# Patient Record
Sex: FEMALE | Race: WHITE | NOT HISPANIC OR LATINO | Employment: UNEMPLOYED | URBAN - NONMETROPOLITAN AREA
[De-identification: names, ages, dates, MRNs, and addresses within clinical notes are randomized per-mention and may not be internally consistent; named-entity substitution may affect disease eponyms.]

---

## 2020-02-28 ENCOUNTER — THERAPY VISIT (OUTPATIENT)
Dept: CHIROPRACTIC MEDICINE | Facility: OTHER | Age: 25
End: 2020-02-28
Attending: CHIROPRACTOR
Payer: COMMERCIAL

## 2020-02-28 DIAGNOSIS — M99.02 SEGMENTAL AND SOMATIC DYSFUNCTION OF THORACIC REGION: ICD-10-CM

## 2020-02-28 DIAGNOSIS — G89.29 CHRONIC BILATERAL LOW BACK PAIN WITHOUT SCIATICA: ICD-10-CM

## 2020-02-28 DIAGNOSIS — M54.50 CHRONIC BILATERAL LOW BACK PAIN WITHOUT SCIATICA: ICD-10-CM

## 2020-02-28 DIAGNOSIS — M54.6 ACUTE RIGHT-SIDED THORACIC BACK PAIN: Primary | ICD-10-CM

## 2020-02-28 DIAGNOSIS — M54.2 CERVICALGIA: ICD-10-CM

## 2020-02-28 DIAGNOSIS — M99.05 SEGMENTAL AND SOMATIC DYSFUNCTION OF PELVIC REGION: ICD-10-CM

## 2020-02-28 DIAGNOSIS — M99.01 SEGMENTAL AND SOMATIC DYSFUNCTION OF CERVICAL REGION: ICD-10-CM

## 2020-02-28 DIAGNOSIS — M25.50 POLYARTHRALGIA: ICD-10-CM

## 2020-02-28 PROCEDURE — 99203 OFFICE O/P NEW LOW 30 MIN: CPT | Mod: 25 | Performed by: CHIROPRACTOR

## 2020-02-28 PROCEDURE — 98941 CHIROPRACT MANJ 3-4 REGIONS: CPT | Mod: AT | Performed by: CHIROPRACTOR

## 2020-02-28 SDOH — SOCIAL STABILITY: SOCIAL INSECURITY: WITHIN THE LAST YEAR, HAVE YOU BEEN AFRAID OF YOUR PARTNER OR EX-PARTNER?: NO
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
## 2020-03-09 ENCOUNTER — THERAPY VISIT (OUTPATIENT)
Dept: CHIROPRACTIC MEDICINE | Facility: OTHER | Age: 25
End: 2020-03-09
Attending: CHIROPRACTOR
Payer: COMMERCIAL

## 2020-03-09 DIAGNOSIS — M54.2 CERVICALGIA: ICD-10-CM

## 2020-03-09 DIAGNOSIS — M25.50 POLYARTHRALGIA: ICD-10-CM

## 2020-03-09 DIAGNOSIS — M99.05 SEGMENTAL AND SOMATIC DYSFUNCTION OF PELVIC REGION: ICD-10-CM

## 2020-03-09 DIAGNOSIS — M54.6 ACUTE RIGHT-SIDED THORACIC BACK PAIN: Primary | ICD-10-CM

## 2020-03-09 DIAGNOSIS — G89.29 CHRONIC BILATERAL LOW BACK PAIN WITHOUT SCIATICA: ICD-10-CM

## 2020-03-09 DIAGNOSIS — M54.50 CHRONIC BILATERAL LOW BACK PAIN WITHOUT SCIATICA: ICD-10-CM

## 2020-03-09 DIAGNOSIS — M99.01 SEGMENTAL AND SOMATIC DYSFUNCTION OF CERVICAL REGION: ICD-10-CM

## 2020-03-09 DIAGNOSIS — M99.02 SEGMENTAL AND SOMATIC DYSFUNCTION OF THORACIC REGION: ICD-10-CM

## 2020-03-09 PROCEDURE — 98941 CHIROPRACT MANJ 3-4 REGIONS: CPT | Mod: AT | Performed by: CHIROPRACTOR

## 2020-03-09 NOTE — PROGRESS NOTES
3/9/2020     Visit #:  2    Subjective:  Miladis Harper is a 24 year old female who is seen in f/u up for:        Acute right-sided thoracic back pain  Segmental and somatic dysfunction of thoracic region  Cervicalgia  Segmental and somatic dysfunction of cervical region  Chronic bilateral low back pain without sciatica  Segmental and somatic dysfunction of pelvic region  Polyarthralgia.     Since last visit on 2/28/2020,  Miladis Harper reports:    Area of chief complaint:  Lumbar :  Symptoms are graded at 5-7/10. The quality is described as dull, throbbing, aching, pressure and constant R low back pain into both hips.   Cervical :  Symptoms are graded at 5/10. The quality is described as tight/stiff, sore and constant.  Thoracic :  Symptoms are graded at 3-6/10. The quality is described as pinching right side and Frequent.    Motion has increased, but is still not normal. Patient feels that they felt less pressure, though sore for a few days after initial visit.  She started to tighten up about 4-5 days ago and looked forward to returning.     Objective:  The following was observed:    P: tenderness   A: static palpation demonstrates intersegmental asymmetry , cervical, thoracic, pelvis  R: motion palpation notes restricted motion, C1 , C3 , T4 , T8  and Sacrum   T: hypertonicity at: Lumbar erector spine, Sub-occipital and Traps Bilaterally    Segmental spinal dysfunction/restrictions found at:  C1 , C3 , T4 , T8 , Sacrum  and on right sacral base      Assessment:    Diagnoses:      1. Acute right-sided thoracic back pain    2. Segmental and somatic dysfunction of thoracic region    3. Cervicalgia    4. Segmental and somatic dysfunction of cervical region    5. Chronic bilateral low back pain without sciatica    6. Segmental and somatic dysfunction of pelvic region    7. Polyarthralgia        Patient's condition:  Patient had restrictions pre-manipulation and Symptoms are unchanged    Treatment  effectiveness:  Post manipulation there is better intersegmental movement, Patient claims to feel looser post manipulation and Patient states that they feel much better post manipulation but they gradually tighten up over time      Procedures:  CMT:  24691 Chiropractic manipulative treatment 3-4 regions performed   Cervical: Diversified, C1 , C3 , Supine  Thoracic: Diversified, T4, T8, Prone  Pelvis: Diversified, Sacrum , PSIS Right , Side posture    Modalities:  None performed this visit    Therapeutic procedures:  None  The following were demonstrated and practiced: hip flexor and quadriceps lunging and standing  Stretches - Reviewed stretches today.  Total time: 5 min.    Response to Treatment  Reduction in symptoms as reported by patient    Prognosis: Good    Progress towards Goals: Patient is making progress towards the goal.     Recommendations:    Instructions:  ice 20 minutes every other hour as needed, stretch as instructed at visit and  hip flexor and quadriceps lunging and standing    Follow-up:    Return to care in 3-4 days recommended. Next appt. Scheduled is 3/26/19 due to provider out of office.  Should you need care sooner, schedule with Viktor Yeung D.C.

## 2020-03-14 NOTE — PATIENT INSTRUCTIONS
ice 20 minutes every other hour as needed, stretch as instructed at visit and  hip flexor and quadriceps lunging and standing    Follow-up:    Return to care in 3-4 days recommended. Next appt. Scheduled is 3/26/19 due to provider out of office.  Should you need care sooner, schedule with Viktor Yeung D.C.     Schedule appt. To establish medical care with a primary care provider.

## 2021-07-17 ENCOUNTER — HOSPITAL ENCOUNTER (EMERGENCY)
Facility: OTHER | Age: 26
Discharge: HOME | End: 2021-07-17
Attending: FAMILY MEDICINE | Admitting: FAMILY MEDICINE
Payer: COMMERCIAL

## 2021-07-17 VITALS
SYSTOLIC BLOOD PRESSURE: 145 MMHG | HEIGHT: 66 IN | BODY MASS INDEX: 28.93 KG/M2 | DIASTOLIC BLOOD PRESSURE: 97 MMHG | RESPIRATION RATE: 18 BRPM | HEART RATE: 78 BPM | WEIGHT: 180 LBS | OXYGEN SATURATION: 100 % | TEMPERATURE: 98.9 F

## 2021-07-17 DIAGNOSIS — R79.89 ELEVATED LFTS: ICD-10-CM

## 2021-07-17 DIAGNOSIS — R03.0 ELEVATED BLOOD PRESSURE READING: ICD-10-CM

## 2021-07-17 LAB
ALBUMIN SERPL-MCNC: 3.2 G/DL (ref 3.5–5.7)
ALBUMIN UR-MCNC: NEGATIVE MG/DL
ALP SERPL-CCNC: 98 U/L (ref 34–104)
ALT SERPL W P-5'-P-CCNC: 57 U/L (ref 7–52)
ANION GAP SERPL CALCULATED.3IONS-SCNC: 8 MMOL/L (ref 3–14)
APPEARANCE UR: CLEAR
AST SERPL W P-5'-P-CCNC: 42 U/L (ref 13–39)
BACTERIA #/AREA URNS HPF: ABNORMAL /HPF
BASOPHILS # BLD AUTO: 0.1 10E3/UL (ref 0–0.2)
BASOPHILS NFR BLD AUTO: 1 %
BILIRUB SERPL-MCNC: 0.3 MG/DL (ref 0.3–1)
BILIRUB UR QL STRIP: NEGATIVE
BUN SERPL-MCNC: 12 MG/DL (ref 7–25)
CALCIUM SERPL-MCNC: 8.5 MG/DL (ref 8.6–10.3)
CHLORIDE BLD-SCNC: 106 MMOL/L (ref 98–107)
CO2 SERPL-SCNC: 23 MMOL/L (ref 21–31)
COLOR UR AUTO: ABNORMAL
CREAT SERPL-MCNC: 0.85 MG/DL (ref 0.6–1.2)
EOSINOPHIL # BLD AUTO: 0.2 10E3/UL (ref 0–0.7)
EOSINOPHIL NFR BLD AUTO: 3 %
ERYTHROCYTE [DISTWIDTH] IN BLOOD BY AUTOMATED COUNT: 15.9 % (ref 10–15)
GFR SERPL CREATININE-BSD FRML MDRD: >90 ML/MIN/1.73M2
GLUCOSE BLD-MCNC: 113 MG/DL (ref 70–105)
GLUCOSE UR STRIP-MCNC: NEGATIVE MG/DL
HCT VFR BLD AUTO: 29.6 % (ref 35–47)
HGB BLD-MCNC: 9.3 G/DL (ref 11.7–15.7)
HGB UR QL STRIP: ABNORMAL
IMM GRANULOCYTES # BLD: 0.1 10E3/UL
IMM GRANULOCYTES NFR BLD: 1 %
KETONES UR STRIP-MCNC: NEGATIVE MG/DL
LEUKOCYTE ESTERASE UR QL STRIP: ABNORMAL
LYMPHOCYTES # BLD AUTO: 2.3 10E3/UL (ref 0.8–5.3)
LYMPHOCYTES NFR BLD AUTO: 29 %
MCH RBC QN AUTO: 25.2 PG (ref 26.5–33)
MCHC RBC AUTO-ENTMCNC: 31.4 G/DL (ref 31.5–36.5)
MCV RBC AUTO: 80 FL (ref 78–100)
MONOCYTES # BLD AUTO: 0.6 10E3/UL (ref 0–1.3)
MONOCYTES NFR BLD AUTO: 8 %
NEUTROPHILS # BLD AUTO: 4.7 10E3/UL (ref 1.6–8.3)
NEUTROPHILS NFR BLD AUTO: 58 %
NITRATE UR QL: NEGATIVE
NRBC # BLD AUTO: 0 10E3/UL
NRBC BLD AUTO-RTO: 0 /100
PH UR STRIP: 7 [PH] (ref 5–9)
PLATELET # BLD AUTO: 180 10E3/UL (ref 150–450)
POTASSIUM BLD-SCNC: 3.5 MMOL/L (ref 3.5–5.1)
PROT SERPL-MCNC: 5.8 G/DL (ref 6.4–8.9)
RBC # BLD AUTO: 3.69 10E6/UL (ref 3.8–5.2)
RBC URINE: 60 /HPF
SODIUM SERPL-SCNC: 137 MMOL/L (ref 134–144)
SP GR UR STRIP: 1.01 (ref 1–1.03)
UROBILINOGEN UR STRIP-MCNC: NORMAL MG/DL
WBC # BLD AUTO: 7.9 10E3/UL (ref 4–11)
WBC URINE: 23 /HPF

## 2021-07-17 PROCEDURE — 85025 COMPLETE CBC W/AUTO DIFF WBC: CPT | Performed by: FAMILY MEDICINE

## 2021-07-17 PROCEDURE — 99283 EMERGENCY DEPT VISIT LOW MDM: CPT | Performed by: FAMILY MEDICINE

## 2021-07-17 PROCEDURE — 87086 URINE CULTURE/COLONY COUNT: CPT | Performed by: FAMILY MEDICINE

## 2021-07-17 PROCEDURE — 36415 COLL VENOUS BLD VENIPUNCTURE: CPT | Performed by: FAMILY MEDICINE

## 2021-07-17 PROCEDURE — 80053 COMPREHEN METABOLIC PANEL: CPT | Performed by: FAMILY MEDICINE

## 2021-07-17 PROCEDURE — 81001 URINALYSIS AUTO W/SCOPE: CPT | Performed by: FAMILY MEDICINE

## 2021-07-17 RX ORDER — ONDANSETRON 4 MG/1
TABLET, ORALLY DISINTEGRATING ORAL
COMMUNITY
Start: 2021-02-16 | End: 2022-08-08

## 2021-07-17 RX ORDER — INSULIN DETEMIR 100 [IU]/ML
INJECTION, SOLUTION SUBCUTANEOUS
COMMUNITY
Start: 2021-06-11 | End: 2022-08-08

## 2021-07-17 ASSESSMENT — ENCOUNTER SYMPTOMS
CHILLS: 0
CHEST TIGHTNESS: 0
HEADACHES: 0
DYSURIA: 0
LIGHT-HEADEDNESS: 0
FEVER: 0
NUMBNESS: 0

## 2021-07-17 ASSESSMENT — MIFFLIN-ST. JEOR: SCORE: 1573.22

## 2021-07-17 NOTE — ED TRIAGE NOTES
"ED Nursing Triage Note (General)   ________________________________    Miladis Harper is a 26 year old Female that presents to triage private car  With history of  Having a BP of 144/100 this am. Pt gave birth on Monday which ended in a fatality of babe. Pt started to have BP issues on July 9th. Pt was told to keep track of her BP. Pt denies having any symptoms at this time with her high BP.   BP (!) 159/94   Pulse 91   Temp 98.9  F (37.2  C) (Tympanic)   Resp 18   Ht 1.676 m (5' 6\")   Wt 81.6 kg (180 lb)   SpO2 100%   Breastfeeding No   BMI 29.05 kg/m  t  Patient appears alert  and oriented, in no acute distress., and cooperative and pleasant behavior.  GCS Total = 15  Airway: intact  Breathing noted as Normal  Circulation Normal  Skin:  Normal  Action taken:  Triage to critical care immediately      PRE HOSPITAL PRIOR LIVING SITUATION Spouse  "

## 2021-07-17 NOTE — ED PROVIDER NOTES
History     Chief Complaint   Patient presents with     Hypertension     HPI  Miladis Harper is a 26 year old female who presents emergency department several days after discharge from [facility] after unfortunate entry of uterine fetal death, she was diagnosed with postpartum preeclampsia and no medications were prescribed but recommended she keep a close eye on her blood pressures.  Patient feels fine today.  Blood pressures have been elevated however.  Reviewed nurses notes below, similar history is related to me.  Miladis Harper is a 26 year old Female that presents to triage private car  With history of  Having a BP of 144/100 this am. Pt gave birth on Monday which ended in a fatality of babe. Pt started to have BP issues on July 9th. Pt was told to keep track of her BP. Pt denies having any symptoms at this time with her high BP.   Allergies:  No Known Allergies    Problem List:    Patient Active Problem List    Diagnosis Date Noted     Polyarthralgia 02/28/2020     Priority: Medium        Past Medical History:    No past medical history on file.    Past Surgical History:    No past surgical history on file.    Family History:    Family History   Problem Relation Age of Onset     Diabetes Type 2  Father      Ataxia Sister      Cancer Maternal Grandmother        Social History:  Marital Status:   [2]  Social History     Tobacco Use     Smoking status: Not on file   Substance Use Topics     Alcohol use: Not on file     Drug use: Not on file        Medications:    sertraline (ZOLOFT) 50 MG tablet  LEVEMIR FLEXTOUCH 100 UNIT/ML pen  ondansetron (ZOFRAN-ODT) 4 MG ODT tab          Review of Systems   Constitutional: Negative for chills and fever.   HENT: Negative for congestion.    Respiratory: Negative for chest tightness.    Genitourinary: Negative for dysuria.   Neurological: Negative for light-headedness, numbness and headaches.       Physical Exam   BP: (!) 159/94  Pulse: 91  Temp: 98.9  F  "(37.2  C)  Resp: 18  Height: 167.6 cm (5' 6\")  Weight: 81.6 kg (180 lb)  SpO2: 100 %      Physical Exam  Vitals and nursing note reviewed.   Cardiovascular:      Rate and Rhythm: Normal rate.   Abdominal:      Tenderness: There is no abdominal tenderness.   Musculoskeletal:      Cervical back: Normal range of motion.   Neurological:      Mental Status: She is alert.       No clonus, normal deep tendon reflexes.  ED Course        Procedures    Results for orders placed or performed during the hospital encounter of 07/17/21 (from the past 24 hour(s))   Comprehensive metabolic panel   Result Value Ref Range    Sodium 137 134 - 144 mmol/L    Potassium 3.5 3.5 - 5.1 mmol/L    Chloride 106 98 - 107 mmol/L    Carbon Dioxide (CO2) 23 21 - 31 mmol/L    Anion Gap 8 3 - 14 mmol/L    Urea Nitrogen 12 7 - 25 mg/dL    Creatinine 0.85 0.60 - 1.20 mg/dL    Calcium 8.5 (L) 8.6 - 10.3 mg/dL    Glucose 113 (H) 70 - 105 mg/dL    Alkaline Phosphatase 98 34 - 104 U/L    AST 42 (H) 13 - 39 U/L    ALT 57 (H) 7 - 52 U/L    Protein Total 5.8 (L) 6.4 - 8.9 g/dL    Albumin 3.2 (L) 3.5 - 5.7 g/dL    Bilirubin Total 0.3 0.3 - 1.0 mg/dL    GFR Estimate >90 >60 mL/min/1.73m2   CBC with platelets differential    Narrative    The following orders were created for panel order CBC with platelets differential.  Procedure                               Abnormality         Status                     ---------                               -----------         ------                     CBC with platelets and d...[[id number]]  Abnormal            Final result                 Please view results for these tests on the individual orders.   CBC with platelets and differential   Result Value Ref Range    WBC Count 7.9 4.0 - 11.0 10e3/uL    RBC Count 3.69 (L) 3.80 - 5.20 10e6/uL    Hemoglobin 9.3 (L) 11.7 - 15.7 g/dL    Hematocrit 29.6 (L) 35.0 - 47.0 %    MCV 80 78 - 100 fL    MCH 25.2 (L) 26.5 - 33.0 pg    MCHC 31.4 (L) 31.5 - 36.5 g/dL    RDW 15.9 (H) 10.0 - " 15.0 %    Platelet Count 180 150 - 450 10e3/uL    % Neutrophils 58 %    % Lymphocytes 29 %    % Monocytes 8 %    % Eosinophils 3 %    % Basophils 1 %    % Immature Granulocytes 1 %    NRBCs per 100 WBC 0 <1 /100    Absolute Neutrophils 4.7 1.6 - 8.3 10e3/uL    Absolute Lymphocytes 2.3 0.8 - 5.3 10e3/uL    Absolute Monocytes 0.6 0.0 - 1.3 10e3/uL    Absolute Eosinophils 0.2 0.0 - 0.7 10e3/uL    Absolute Basophils 0.1 0.0 - 0.2 10e3/uL    Absolute Immature Granulocytes 0.1 (H) <=0.0 10e3/uL    Absolute NRBCs 0.0 10e3/uL   UA with Microscopic reflex to Culture    Specimen: Urine, Midstream   Result Value Ref Range    Color Urine Light Yellow Colorless, Straw, Light Yellow, Yellow    Appearance Urine Clear Clear    Glucose Urine Negative Negative mg/dL    Bilirubin Urine Negative Negative    Ketones Urine Negative Negative mg/dL    Specific Gravity Urine 1.010 1.000 - 1.030    Blood Urine Large (A) Negative    pH Urine 7.0 5.0 - 9.0    Protein Albumin Urine Negative Negative mg/dL    Urobilinogen Urine Normal Normal, 2.0 mg/dL    Nitrite Urine Negative Negative    Leukocyte Esterase Urine Large (A) Negative    Bacteria Urine Few (A) None Seen /HPF    RBC Urine 60 (H) <=2 /HPF    WBC Urine 23 (H) <=5 /HPF    Narrative    Urine Culture ordered based on laboratory criteria       Medications - No data to display    Assessments & Plan (with Medical Decision Making)     I have reviewed the nursing notes.    I have reviewed the findings, diagnosis, plan and need for follow up with the patient.  Reassuring lab work-up, LFTs trending down, discussed with OB, no antihypertensives recommended at this time.  Close follow-up recommended.  Patient has follow-up this week with essential providers.  Recommend she follow-up with either Dr. Wesley or Minerva as previously recommended.  Return to ED with worsening symptoms such as persistent blood pressure over 160 headache visual changes or persistent abdominal pain.  Patient verbalized  understanding plans agreement left ED in improving condition.    New Prescriptions    No medications on file       Final diagnoses:   Elevated blood pressure reading   Elevated LFTs       7/17/2021   [facility]     Joey Bran MD  07/17/21 [id number]

## 2021-07-19 LAB — BACTERIA UR CULT: NORMAL

## 2021-07-20 ENCOUNTER — THERAPY VISIT (OUTPATIENT)
Dept: CHIROPRACTIC MEDICINE | Facility: OTHER | Age: 26
End: 2021-07-20
Attending: CHIROPRACTOR
Payer: COMMERCIAL

## 2021-07-20 DIAGNOSIS — M99.02 SEGMENTAL AND SOMATIC DYSFUNCTION OF THORACIC REGION: Primary | ICD-10-CM

## 2021-07-20 DIAGNOSIS — M54.50 CHRONIC BILATERAL LOW BACK PAIN WITHOUT SCIATICA: ICD-10-CM

## 2021-07-20 DIAGNOSIS — M99.04 SEGMENTAL AND SOMATIC DYSFUNCTION OF SACRAL REGION: ICD-10-CM

## 2021-07-20 DIAGNOSIS — M99.01 SEGMENTAL AND SOMATIC DYSFUNCTION OF CERVICAL REGION: ICD-10-CM

## 2021-07-20 DIAGNOSIS — M54.6 PAIN IN THORACIC SPINE: ICD-10-CM

## 2021-07-20 DIAGNOSIS — G89.29 CHRONIC BILATERAL LOW BACK PAIN WITHOUT SCIATICA: ICD-10-CM

## 2021-07-20 DIAGNOSIS — M54.2 CERVICALGIA: ICD-10-CM

## 2021-07-20 DIAGNOSIS — M99.03 SEGMENTAL AND SOMATIC DYSFUNCTION OF LUMBAR REGION: ICD-10-CM

## 2021-07-20 PROCEDURE — 99213 OFFICE O/P EST LOW 20 MIN: CPT | Mod: 25 | Performed by: CHIROPRACTOR

## 2021-07-20 PROCEDURE — 98941 CHIROPRACT MANJ 3-4 REGIONS: CPT | Mod: AT | Performed by: CHIROPRACTOR

## 2021-07-20 NOTE — RESULT ENCOUNTER NOTE
Final urine culture report is negative.  Adult Negative Urine culture parameters per protocol: Any # Urogenital single or mixed organism, <10,000 col/ml single organism (cath specimen), and <50,000 col/ml single organism (midstream or cath specimen).  [facility] Emergency Dept discharge antibiotic prescribed (If applicable): None  Treatment recommendations per [facility] ED Lab Result Urine Culture protocol.
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
## 2021-07-27 ENCOUNTER — THERAPY VISIT (OUTPATIENT)
Dept: CHIROPRACTIC MEDICINE | Facility: OTHER | Age: 26
End: 2021-07-27
Attending: CHIROPRACTOR
Payer: COMMERCIAL

## 2021-07-27 DIAGNOSIS — M54.2 CERVICALGIA: ICD-10-CM

## 2021-07-27 DIAGNOSIS — M54.50 CHRONIC BILATERAL LOW BACK PAIN WITHOUT SCIATICA: ICD-10-CM

## 2021-07-27 DIAGNOSIS — M54.6 PAIN IN THORACIC SPINE: ICD-10-CM

## 2021-07-27 DIAGNOSIS — M99.04 SEGMENTAL AND SOMATIC DYSFUNCTION OF SACRAL REGION: ICD-10-CM

## 2021-07-27 DIAGNOSIS — G89.29 CHRONIC BILATERAL LOW BACK PAIN WITHOUT SCIATICA: ICD-10-CM

## 2021-07-27 DIAGNOSIS — M99.01 SEGMENTAL AND SOMATIC DYSFUNCTION OF CERVICAL REGION: ICD-10-CM

## 2021-07-27 DIAGNOSIS — M99.02 SEGMENTAL AND SOMATIC DYSFUNCTION OF THORACIC REGION: Primary | ICD-10-CM

## 2021-07-27 PROCEDURE — 98941 CHIROPRACT MANJ 3-4 REGIONS: CPT | Mod: AT | Performed by: CHIROPRACTOR

## 2021-07-27 NOTE — PROGRESS NOTES
Visit #:  2    Subjective:  Miladis Harper is a 26 year old female who is seen in f/u up for:        Segmental and somatic dysfunction of thoracic region  Pain in thoracic spine  Segmental and somatic dysfunction of cervical region  Cervicalgia  Segmental and somatic dysfunction of sacral region  Chronic bilateral low back pain without sciatica.     Since last visit on 7/20/2021,  Miladis Harper reports: Following last treatment symptoms showed quite a bit of improvement for a few days.  Notes that over the past couple of days pain has been returning to current levels.  Reports that she has been able to receive massages from her spouse which seem to be more effective in helping to reduce neck and back pain primarily of the neck and upper back region.    Notes majority of back pain present when she performs bending to extension motions such as picking up objects off the ground.    Reports that blood pressure has been decreasing as well.  Currently scheduled to see Sayra Soriano NP with [facility] soon before following up with Dr. Lupillo LOPEZ.    Area of chief complaint:  Cervical :  Symptoms are graded at 3/10. The quality is described as occasionally stiff.    Thoracic :  Symptoms are graded at 3/10. The quality is described as frequent pinching.   Lumbar :  Symptoms are graded at 5-8/10. The quality is described as achey, constantly.      Objective:  The following was observed:    P: palpatory tendernessRight side C1, left side C6, T3 on right, none elicited of the lumbopelvic spine:    A: static palpation demonstrates intersegmental asymmetry , cervical, thoracic, pelvis  R: motion palpation notes restricted motion, C1 , C6 , T3  and Sacrum   T: Mild spasms noted of the cervical paraspinal musculature bilaterally, right rhomboids.  Hypertonicity noted of the lumbar paraspinals bilaterally left greater than right    Segmental spinal dysfunction/restrictions found at:  :  C1 Left rotation restricted and Right lateral  flexion restricted  C6 Right rotation restricted, Left lateral flexion restricted and Extension restriction  T3 Right lateral flexion restricted and Extension restriction  Sacrum Left lateral flexion restricted and Extension restriction.      Assessment: Symptoms are showing signs of progress.  Continue once a week care.    Diagnoses:      1. Segmental and somatic dysfunction of thoracic region    2. Pain in thoracic spine    3. Segmental and somatic dysfunction of cervical region    4. Cervicalgia    5. Segmental and somatic dysfunction of sacral region    6. Chronic bilateral low back pain without sciatica        Patient's condition:  Patient had restrictions pre-manipulation, Patient symptoms are gradually improving and Symptoms come and go    Treatment effectiveness:  Post manipulation there is better intersegmental movement, Patient states that they feel much better post manipulation but they gradually tighten up over time, Patients symptoms are getting better, Tenderness is decreasing and Muscle spasm is reducing      Procedures:  CMT:  25589 Chiropractic manipulative treatment 3-4 regions performed   Cervical: Diversified, C1 , C6, Supine  Thoracic: Diversified, T3, Prone  Pelvis: Diversified, Sacrum , Side posture    Modalities:  None performed this visit    Therapeutic procedures:  None    Response to Treatment  Reduction in symptoms as reported by patient    Prognosis: Good    Progress towards Goals: Patient is making progress towards the goal.     Recommendations:    Instructions:Monitor symptoms and perform activities as tolerated    Follow-up:  Return to care in 1 week.

## 2021-08-03 ENCOUNTER — THERAPY VISIT (OUTPATIENT)
Dept: CHIROPRACTIC MEDICINE | Facility: OTHER | Age: 26
End: 2021-08-03
Attending: CHIROPRACTOR
Payer: COMMERCIAL

## 2021-08-03 DIAGNOSIS — M99.01 SEGMENTAL AND SOMATIC DYSFUNCTION OF CERVICAL REGION: ICD-10-CM

## 2021-08-03 DIAGNOSIS — M99.02 SEGMENTAL AND SOMATIC DYSFUNCTION OF THORACIC REGION: Primary | ICD-10-CM

## 2021-08-03 DIAGNOSIS — G89.29 CHRONIC BILATERAL LOW BACK PAIN WITHOUT SCIATICA: ICD-10-CM

## 2021-08-03 DIAGNOSIS — M54.2 CERVICALGIA: ICD-10-CM

## 2021-08-03 DIAGNOSIS — M54.6 PAIN IN THORACIC SPINE: ICD-10-CM

## 2021-08-03 DIAGNOSIS — M54.50 CHRONIC BILATERAL LOW BACK PAIN WITHOUT SCIATICA: ICD-10-CM

## 2021-08-03 DIAGNOSIS — M99.03 SEGMENTAL AND SOMATIC DYSFUNCTION OF LUMBAR REGION: ICD-10-CM

## 2021-08-03 PROCEDURE — 98941 CHIROPRACT MANJ 3-4 REGIONS: CPT | Mod: AT | Performed by: CHIROPRACTOR

## 2021-08-03 NOTE — PROGRESS NOTES
Visit #:  3    Subjective:  Miladis Harper is a 26 year old female who is seen in f/u up for:        Segmental and somatic dysfunction of thoracic region  Pain in thoracic spine  Segmental and somatic dysfunction of cervical region  Cervicalgia  Segmental and somatic dysfunction of lumbar region  Chronic bilateral low back pain without sciatica.     Since last visit on 7/27/2021,  Miladis Harper reports: Symptoms showed modest improvement after last visit.  Noted that on Sunday she awoke from a nap with a left-sided low back spasm.  This has remained quite sore since then.  It does seem that upper back symptoms also slightly more aggravated on today's visit.  Neck pain does appear to be improving however.    Blood pressure continues to show improvement.  Was able to follow-up with Sayra Soirano NP with [facility]. Post OB visit with Dr. Lupillo LOPEZ scheduled for 8/30/21    Reports noticing increasing levels of bilateral knee pain.  Patient unsure if there is any clicking, catching, or other such symptoms present at this time.  Most notable when she is walking up and down stairs.    Patient's  is in attendance on today's visit.    Area of chief complaint:  Thoracic :  Symptoms are graded at 4/10. The quality is described as frequently achey.     Cervical :  Symptoms are graded at 2/10. The quality is described as occasionally achey.  Lumbar :  Symptoms are graded at 5-6/10. The quality is described as achey, and spasmed constantly.    Objective:  The following was observed:    P: palpatory tendernessSuboccipital ridge bilaterally, right side T3, left side L5, left quadratus lumborum, palpatory tenderness mild of the lateral quadriceps and TF L's bilaterally:    A: static palpation demonstrates intersegmental asymmetry , cervical, thoracic, lumbar  R: motion palpation notes restricted motion, C1 , C2  and T3  L5  T: muscle spasm at level(s): Right rhomboids, suboccipitals bilaterally, left quadratus  lumborum, taut and tender fibers noted TF L bilaterally, lateral quadricep bilaterally:  Bilaterally    Segmental spinal dysfunction/restrictions found at:  C1 Left rotation restricted and Right lateral flexion restricted  C2 Left lateral flexion restricted and Extension restriction  T3 Left rotation restricted and Extension restriction  L5 Right rotation restricted, Left lateral flexion restricted and Extension restriction.      Assessment: Patient is showing signs of progress.  Mild/moderate aggravation low back pain.  Evidence of spasm of the cubital muscle.  Provided patient reassurance that it is not abnormal for symptoms to flare especially at this point in treatment.  This should resolve with time and treatment.    Bilateral knee pain likely secondary to tight quadricep musculature and TF L musculature.  Provide patient with home resources, stretching and self myofascial release to help lessen stress placed on the patella.      Continue at once a week care barring acute exacerbation of symptoms.        Diagnoses:      1. Segmental and somatic dysfunction of thoracic region    2. Pain in thoracic spine    3. Segmental and somatic dysfunction of cervical region    4. Cervicalgia    5. Segmental and somatic dysfunction of lumbar region    6. Chronic bilateral low back pain without sciatica        Patient's condition:  Patient had restrictions pre-manipulation, Symptoms come and go and Patient symptoms are worsened.    Treatment effectiveness:  Post manipulation there is better intersegmental movement and Patient claims to feel looser post manipulation      Procedures:  CMT:  22041 Chiropractic manipulative treatment 3-4 regions performed   Cervical: Diversified, C1 , C2, Supine  Thoracic: Diversified, T3, Prone  Lumbar: Diversified, L5, Side posture    Modalities:  52307: IASTM: To Right rhomboids:  for 1 min    Therapeutic procedures:  Encourage myofascial release/foam rolling quadricep musculature bilaterally,  T FL muscle bilaterally  Quadricep stretch reviewed     Response to Treatment  Reduction in symptoms as reported by patient    Prognosis: Good    Progress towards Goals: Patient is making progress towards the goal.    Patient will report improved pain.              Patient will report able to recreate without painful limits.              Patient will demonstrate proper use of home care strategies              Patient will demonstrate an improved ability to complete Activities of Daily Living   as shown by a reported 10-30% reduced score on neck and/or back index.               Patient will demonstrate improved ROM.    Recommendations:    Instructions:stretch as instructed at visit    Follow-up:  Return to care in 1 week.

## 2021-08-09 ENCOUNTER — THERAPY VISIT (OUTPATIENT)
Dept: CHIROPRACTIC MEDICINE | Facility: OTHER | Age: 26
End: 2021-08-09
Attending: CHIROPRACTOR
Payer: COMMERCIAL

## 2021-08-09 DIAGNOSIS — M54.6 PAIN IN THORACIC SPINE: ICD-10-CM

## 2021-08-09 DIAGNOSIS — M99.02 SEGMENTAL AND SOMATIC DYSFUNCTION OF THORACIC REGION: Primary | ICD-10-CM

## 2021-08-09 DIAGNOSIS — M99.03 SEGMENTAL AND SOMATIC DYSFUNCTION OF LUMBAR REGION: ICD-10-CM

## 2021-08-09 DIAGNOSIS — M54.50 CHRONIC BILATERAL LOW BACK PAIN WITHOUT SCIATICA: ICD-10-CM

## 2021-08-09 DIAGNOSIS — G89.29 CHRONIC BILATERAL LOW BACK PAIN WITHOUT SCIATICA: ICD-10-CM

## 2021-08-09 PROCEDURE — 98940 CHIROPRACT MANJ 1-2 REGIONS: CPT | Mod: AT | Performed by: CHIROPRACTOR

## 2021-08-09 NOTE — PROGRESS NOTES
Visit #:  4    Subjective:  Miladis Harper is a 26 year old female who is seen in f/u up for:        Segmental and somatic dysfunction of thoracic region  Pain in thoracic spine  Segmental and somatic dysfunction of lumbar region  Chronic bilateral low back pain without sciatica.     Since last visit on 8/3/2021,  Miladis Harper reports: Neck symptoms showing some improvement since last visit.  Patient states that over the past week she was much more active and believes this has increased some of her neck and back pain symptoms.  Reports doing home stretching exercises recommended from last visit.  These do seem to be providing some relief.    Bilateral knee pain still present.  Unsure if home stretches from prior visit providing much relief with these.    Noted blood pressure in her feet especially when sitting on the ground with her feet contacting hard surface.  No lower extremity radiculopathy noted at this time, no swelling noted of the lower extremities.    Area of chief complaint:  Thoracic :  Symptoms are graded at 4/10. The quality is described as frequently pinching.   Lumbar :  Symptoms are graded at 6/10. The quality is described as constantly achey and sore.   Cervical :  Symptoms are graded at 0/10.      Objective:  The following was observed:    P: Cervical paraspinal musculature bilaterally, T3 right, L5 on left  A: static palpation demonstrates intersegmental asymmetry , thoracic, lumbar  R: motion palpation notes restricted motion, T3  and L5   T: muscle spasm at level(s): Cervical paraspinal musculature right greater than left, taut tender fibers noted right rhomboids, taut tender fibers noted left quadratus lumborum:      Segmental spinal dysfunction/restrictions found at:  :  T3 Right lateral flexion restricted and Extension restriction  L5 Left lateral flexion restricted and Extension restriction.      Assessment: Patient does appear to be showing slow signs of progress.  Continue care at  once week.    Diagnoses:      1. Segmental and somatic dysfunction of thoracic region    2. Pain in thoracic spine    3. Segmental and somatic dysfunction of lumbar region    4. Chronic bilateral low back pain without sciatica        Patient's condition:  Patient had restrictions pre-manipulation    Treatment effectiveness:  Post manipulation there is better intersegmental movement and Patient claims to feel looser post manipulation      Procedures:  CMT:  74659 Chiropractic manipulative treatment 1-2 regions performed   Thoracic: Diversified, T3, Prone  Lumbar: Diversified, L5, Side posture    Modalities:  26246: MSTM:  To Cervical paraspinals, right rhomboids:   for 4 min    Therapeutic procedures:  Triceps kick back for lower trap engagement.    Response to Treatment  Reduction in symptoms as reported by patient    Prognosis: Good    Progress towards Goals: Patient is making progress towards the goal.    Patient will report improved pain.              Patient will report able to recreate without painful limits.              Patient will demonstrate proper use of home care strategies              Patient will demonstrate an improved ability to complete Activities of Daily Living   as shown by a reported 10-30% reduced score on neck and/or back index.               Patient will demonstrate improved ROM.      Recommendations:    Instructions:ice 20 minutes every other hour as needed, heat 15 minutes every other hour as needed and stretch as instructed at visit    Follow-up:  Return to care in 1 week.

## 2021-08-16 ENCOUNTER — THERAPY VISIT (OUTPATIENT)
Dept: CHIROPRACTIC MEDICINE | Facility: OTHER | Age: 26
End: 2021-08-16
Attending: CHIROPRACTOR
Payer: COMMERCIAL

## 2021-08-16 DIAGNOSIS — M99.01 SEGMENTAL AND SOMATIC DYSFUNCTION OF CERVICAL REGION: ICD-10-CM

## 2021-08-16 DIAGNOSIS — M54.2 CERVICALGIA: ICD-10-CM

## 2021-08-16 DIAGNOSIS — M54.6 PAIN IN THORACIC SPINE: ICD-10-CM

## 2021-08-16 DIAGNOSIS — M99.03 SEGMENTAL AND SOMATIC DYSFUNCTION OF LUMBAR REGION: ICD-10-CM

## 2021-08-16 DIAGNOSIS — M99.02 SEGMENTAL AND SOMATIC DYSFUNCTION OF THORACIC REGION: Primary | ICD-10-CM

## 2021-08-16 DIAGNOSIS — G89.29 CHRONIC BILATERAL LOW BACK PAIN WITHOUT SCIATICA: ICD-10-CM

## 2021-08-16 DIAGNOSIS — M54.50 CHRONIC BILATERAL LOW BACK PAIN WITHOUT SCIATICA: ICD-10-CM

## 2021-08-16 PROCEDURE — 98941 CHIROPRACT MANJ 3-4 REGIONS: CPT | Mod: AT | Performed by: CHIROPRACTOR

## 2021-08-16 NOTE — PROGRESS NOTES
Visit #:  5    Subjective:  Miladis Harper is a 26 year old female who is seen in f/u up for:        Segmental and somatic dysfunction of thoracic region  Pain in thoracic spine  Segmental and somatic dysfunction of lumbar region  Chronic bilateral low back pain without sciatica  Segmental and somatic dysfunction of cervical region  Cervicalgia.     Since last visit on 8/9/2021,  Miladis Harper reports: Following last treatment patient noted increased level of soreness primarily of the mid to low back.  Was busy at family camp for several days.  Notes that symptoms did not seem to be aggravated from camping activities however.  No aggravating factors such as traumatic events or overuse type injuries.  Patient does report doing quite a bit of woodwork recently which may have contributed.  Further details not provided by the patient regarding this.  Neck pain is showing quite a bit of improvement.    Patient stated symptoms that she has been increasingly fatigued and sleeping more as of recently.  No other signs or symptoms accompanying this    Area of chief complaint:  Cervical :  Symptoms are graded at 4/10. The quality is described as occasionally achey and dull.   Thoracic :  Symptoms are graded at 5/10. The quality is described as achey,frequently.   Lumbar :  Symptoms are graded at 6/10. The quality is described as achey, dull, constantly.     Objective:  The following was observed:  Neck Disability Index (  Jeff H. and Wili C. 1991. All rights reserved.; used with permission) 8/16/2021   SECTION 1 - PAIN INTENSITY 1   SECTION 2 - PERSONAL CARE 0   SECTION 3 - LIFTING 0   SECTION 4 - READING 3   SECTION 5 - HEADACHES 0   SECTION 6 - CONCENTRATION 1   SECTION 7 - WORK 2   SECTION 8 - DRIVING 0   SECTION 9 - SLEEPING 1   SECTION 10 - RECREATION 2   Count 10   Sum 10   Raw Score: /50 10   Neck Disability Index Score: (%) 20     Improved from 32%    Oswestry (CHRISTIE) Questionnaire    OSWESTRY DISABILITY INDEX  8/16/2021   Count 9   Sum 17   Oswestry Score (%) 37.78      Slight increase from 35.56%    P: palpatory tendernessC2 right, C6 left, T3 right, TL junction midline:    A: static palpation demonstrates intersegmental asymmetry , cervical, thoracic, lumbar  R: motion palpation notes restricted motion, C2 , C6 , T3  and L1  T: Mild spasms noted right rhomboids, quadratus lumborum bilaterally    Segmental spinal dysfunction/restrictions found at:  :  C2 Left rotation restricted and Extension restriction  C6 Left lateral flexion restricted and Extension restriction  T3 Extension restriction  L1 Extension restriction.      Assessment: Patient is showing signs of progress, symptoms do appear to be flared on today's visit.  Plan to follow-up with patient again in 1 week    Diagnoses:      1. Segmental and somatic dysfunction of thoracic region    2. Pain in thoracic spine    3. Segmental and somatic dysfunction of lumbar region    4. Chronic bilateral low back pain without sciatica    5. Segmental and somatic dysfunction of cervical region    6. Cervicalgia        Patient's condition:  Patient had restrictions pre-manipulation and Patient symptoms are worsed due to unknown reasons.    Treatment effectiveness:  Post manipulation there is better intersegmental movement and Patient claims to feel looser post manipulation      Procedures:  CMT:  46412 Chiropractic manipulative treatment 3-4 regions performed   Cervical: Diversified, C2, C6, Prone  Thoracic: Diversified, T4, Prone  Lumbar: Diversified, L1, Prone    Modalities:  Discussed scapular stabilization exercises    Therapeutic procedures:  None    Response to Treatment  Reduction in symptoms as reported by patient    Prognosis: Good    Progress towards Goals: Patient is making progress towards the goal.     Patient will report improved pain.              Patient will report able to recreate without painful limits.              Patient will demonstrate proper use of home  care strategies              Patient will demonstrate an improved ability to complete Activities of Daily Living   as shown by a reported 10-30% reduced score on neck and/or back index.               Patient will demonstrate improved ROM.      Recommendations:    Instructions:ice 20 minutes every other hour as needed and heat 15 minutes every other hour as needed    Follow-up:  Return to care in 1 week.

## 2021-08-25 ENCOUNTER — THERAPY VISIT (OUTPATIENT)
Dept: CHIROPRACTIC MEDICINE | Facility: OTHER | Age: 26
End: 2021-08-25
Attending: CHIROPRACTOR
Payer: COMMERCIAL

## 2021-08-25 DIAGNOSIS — G89.29 CHRONIC BILATERAL LOW BACK PAIN WITHOUT SCIATICA: ICD-10-CM

## 2021-08-25 DIAGNOSIS — M99.02 SEGMENTAL AND SOMATIC DYSFUNCTION OF THORACIC REGION: ICD-10-CM

## 2021-08-25 DIAGNOSIS — M54.2 CERVICALGIA: ICD-10-CM

## 2021-08-25 DIAGNOSIS — M99.01 SEGMENTAL AND SOMATIC DYSFUNCTION OF CERVICAL REGION: Primary | ICD-10-CM

## 2021-08-25 DIAGNOSIS — M54.6 PAIN IN THORACIC SPINE: ICD-10-CM

## 2021-08-25 DIAGNOSIS — M54.50 CHRONIC BILATERAL LOW BACK PAIN WITHOUT SCIATICA: ICD-10-CM

## 2021-08-25 PROCEDURE — 98940 CHIROPRACT MANJ 1-2 REGIONS: CPT | Mod: AT | Performed by: CHIROPRACTOR

## 2021-08-31 ENCOUNTER — THERAPY VISIT (OUTPATIENT)
Dept: CHIROPRACTIC MEDICINE | Facility: OTHER | Age: 26
End: 2021-08-31
Attending: CHIROPRACTOR
Payer: COMMERCIAL

## 2021-08-31 DIAGNOSIS — M54.6 PAIN IN THORACIC SPINE: ICD-10-CM

## 2021-08-31 DIAGNOSIS — M99.02 SEGMENTAL AND SOMATIC DYSFUNCTION OF THORACIC REGION: ICD-10-CM

## 2021-08-31 DIAGNOSIS — G89.29 CHRONIC BILATERAL LOW BACK PAIN WITHOUT SCIATICA: ICD-10-CM

## 2021-08-31 DIAGNOSIS — M99.01 SEGMENTAL AND SOMATIC DYSFUNCTION OF CERVICAL REGION: Primary | ICD-10-CM

## 2021-08-31 DIAGNOSIS — M54.50 CHRONIC BILATERAL LOW BACK PAIN WITHOUT SCIATICA: ICD-10-CM

## 2021-08-31 PROCEDURE — 98940 CHIROPRACT MANJ 1-2 REGIONS: CPT | Mod: AT | Performed by: CHIROPRACTOR

## 2021-08-31 NOTE — PROGRESS NOTES
"Visit #:  7    Subjective:  Miladis Harper is a 26 year old female who is seen in f/u up for:        Segmental and somatic dysfunction of cervical region  Segmental and somatic dysfunction of thoracic region  Pain in thoracic spine  Chronic bilateral low back pain without sciatica.     Since last visit on 8/25/2021,  Miladis Harper reports: Symptoms started to show some noticeable/lasting improvement.  Reports that she notices that she is pain-free for 6+ hours post treatment.  Symptoms slowly worsen back.  Patient is continuing to try to keep active generally along with doing stretching and home massage.  While this does seem helpful patient notes that by the end today she feels quite \"whacked.\"    Followed up with Karyn Shine and Dr. Lupillo LOPEZ following last treatment.    Reports Karyn quite concerned of bipolar disorder and has referred patient for counseling.  Review of patient's chart is show ultrasound study following patient's bloody discharge.    Dr. Wesley followed up with patient on Monday.  Discussed several topics regarding patient's health including but not limited to recent stillbirth and subsequent recent discharge, mental health, physical therapy/bodily pain.    Full autopsy report from patient stillbirth not readily available however patient is being referred for genetic counseling. At this time it does not appear that patient's recent bloody discharge was of concern.    Patient also referred to physical therapy at [facility]. Patient reports that she will begin physical therapy in late September. Review of patient's chart shows that Dr. Wesley believes patient to be suffering with fibromyalgia. Patient also has history of diastasis recti.    Area of chief complaint:  Cervical :  Symptoms are graded at 0/10. The quality is described as dull.    Thoracic :  Symptoms are graded at 3-6/10. The quality is described as achey, constantly.   Lumbar :  Symptoms are graded at 5-7/10. The " quality is described as achey, constantly.      Objective:  The following was observed:    P: palpatory tendernessC6 left, T6 right, TL junction midline:    A: static palpation demonstrates intersegmental asymmetry , cervical, thoracic  R: motion palpation notes restricted motion, C6 , T6  and T12   T: muscle spasm at level(s): Paraspinal musculature lower cervical spine on left, taut tender fibers noted paraspinal musculature TL junction bilaterally, quadratus lumborum bilaterally,:      Segmental spinal dysfunction/restrictions found at:  :  C6 Left lateral flexion restricted and Extension restriction  T6 Right lateral flexion restricted and Extension restriction  T12 Extension restriction.      Assessment: Patient does appear to be showing signs of progress. Very pleased to hear that patient will be able to begin physical therapy as I believe this will provide patient with additional benefits including chiropractic care. At this time recommend continuation at once a week as patient is beginning to find longer lasting relief/benefits with chiropractic care. Encourage patient to continue to be active as I do believe that fibromyalgia is a likely diagnosis for patient's ongoing pain levels.    Diagnoses:      1. Segmental and somatic dysfunction of cervical region    2. Segmental and somatic dysfunction of thoracic region    3. Pain in thoracic spine    4. Chronic bilateral low back pain without sciatica        Patient's condition:  Patient had restrictions pre-manipulation and Patient symptoms are gradually improving    Treatment effectiveness:  Post manipulation there is better intersegmental movement, Patient claims to feel looser post manipulation, Post manipulation the patient improves and then feels more restricted motion over time and Symptoms appear to be decreasing      Procedures:  CMT:  98022 Chiropractic manipulative treatment 1-2 regions performed   Cervical: Diversified, C6, Supine  Thoracic:  Diversified, T6, T12, Prone    Modalities:  None performed this visit    Therapeutic procedures:  None    Response to Treatment  Reduction in symptoms as reported by patient    Prognosis: Good    Progress towards Goals: Patient is making progress towards the goal.     Recommendations:    Instructions:none    Follow-up:  Return to care in 1 week.

## 2021-09-07 ENCOUNTER — THERAPY VISIT (OUTPATIENT)
Dept: CHIROPRACTIC MEDICINE | Facility: OTHER | Age: 26
End: 2021-09-07
Attending: CHIROPRACTOR
Payer: COMMERCIAL

## 2021-09-07 DIAGNOSIS — M54.2 CERVICALGIA: Primary | ICD-10-CM

## 2021-09-07 DIAGNOSIS — M99.02 SEGMENTAL AND SOMATIC DYSFUNCTION OF THORACIC REGION: ICD-10-CM

## 2021-09-07 DIAGNOSIS — M54.6 PAIN IN THORACIC SPINE: ICD-10-CM

## 2021-09-07 DIAGNOSIS — M54.50 CHRONIC BILATERAL LOW BACK PAIN WITHOUT SCIATICA: ICD-10-CM

## 2021-09-07 DIAGNOSIS — G89.29 CHRONIC BILATERAL LOW BACK PAIN WITHOUT SCIATICA: ICD-10-CM

## 2021-09-07 PROCEDURE — 98940 CHIROPRACT MANJ 1-2 REGIONS: CPT | Mod: AT | Performed by: CHIROPRACTOR

## 2021-09-08 NOTE — PROGRESS NOTES
Visit #:  8    Subjective:  Miladis Harper is a 26 year old female who is seen in f/u up for:        Cervicalgia  Segmental and somatic dysfunction of thoracic region  Pain in thoracic spine  Chronic bilateral low back pain without sciatica.     Since last visit on 8/31/2021,  Miladis Harper reports: Symptoms continue to show temporary improvement post treatment.  Symptoms following treatment will come and go it seems without provocation.  Does believe that she is benefited with course of chiropractic care.  Currently scheduled to begin physical therapy within the next week or so with [facility].    New symptom dizziness reported by the patient.  No triggering factors such as standing up quickly, laying supine or turning to one side.  Unsure if blood pressure has been altered at any point but reports that she intends to monitor this due to prior hypertension post stillbirth.  Recently underwent ultrasound of her abdomen.  This was following recent bloody discharge post stillbirth.  At this time it is believed that this is remnants of the birth.  Patient currently waiting to be seen by OB/GYN specialist however this cannot occur for a couple of weeks.  Unsure if dizziness may be related.    Area of chief complaint:  Cervical :  Symptoms are graded at 2/10. The quality is described as dull.    Thoracic :  Symptoms are graded at 4-6/10. The quality is described as pinching.    Lumbar :  Symptoms are graded at 4-7/10. The quality is described as pinching  Objective:  The following was observed:    P: palpatory tendernessTL junction, right side T4-6:    A: static palpation demonstrates intersegmental asymmetry , thoracic  R: motion palpation notes restricted motion, T4, T6, T12  T: mild spasm right rhomboids, Taut/tender fibers noted along paraspinal musculature TL junction bilaterally    Segmental spinal dysfunction/restrictions found at:  :  T4 Right lateral flexion restricted and Extension restriction  T6  Left rotation restricted and Extension restriction  T12 Extension restriction.      Assessment: patient has shown some improvement through course of chiropractic care. Symptoms continue to remain quite elevated. Regarding chiropractic care I don't believe that the patient will benefit much more with further treatment. Plan for 1 follow up visit, barring acute exacerbation. Physical therapy will begin within the next week or there about's which should provide further benefit for the patient.     Dizzy symptoms don't appear to be cervicogenic. Possibly related to patient's pregnancy. Recommend continuing to monitor symptoms, and check blood pressure. Follow up with primary provider if symptoms fail to improve or worsen    Diagnoses:      1. Cervicalgia    2. Segmental and somatic dysfunction of thoracic region    3. Pain in thoracic spine    4. Chronic bilateral low back pain without sciatica        Patient's condition:  Patient had restrictions pre-manipulation and Symptoms come and go    Treatment effectiveness:  Post manipulation there is better intersegmental movement and Patient states that they feel much better post manipulation but they gradually tighten up over time      Procedures:  CMT:  17832 Chiropractic manipulative treatment 1-2 regions performed   Thoracic: Diversified, T4, T6, T12, Prone    Modalities:  None performed this visit    Therapeutic procedures:  None    Response to Treatment  Reduction in symptoms as reported by patient    Prognosis: Good    Progress towards Goals: Patient is making progress towards the goal.     Recommendations:    Instructions:monitor symptoms    Follow-up:  Continue treatment PRN.

## 2021-09-14 ENCOUNTER — THERAPY VISIT (OUTPATIENT)
Dept: CHIROPRACTIC MEDICINE | Facility: OTHER | Age: 26
End: 2021-09-14
Attending: CHIROPRACTOR
Payer: COMMERCIAL

## 2021-09-14 DIAGNOSIS — G89.29 CHRONIC BILATERAL LOW BACK PAIN WITHOUT SCIATICA: ICD-10-CM

## 2021-09-14 DIAGNOSIS — M99.01 SEGMENTAL AND SOMATIC DYSFUNCTION OF CERVICAL REGION: ICD-10-CM

## 2021-09-14 DIAGNOSIS — M99.02 SEGMENTAL AND SOMATIC DYSFUNCTION OF THORACIC REGION: ICD-10-CM

## 2021-09-14 DIAGNOSIS — M54.50 CHRONIC BILATERAL LOW BACK PAIN WITHOUT SCIATICA: ICD-10-CM

## 2021-09-14 DIAGNOSIS — M54.6 PAIN IN THORACIC SPINE: ICD-10-CM

## 2021-09-14 DIAGNOSIS — M54.2 CERVICALGIA: Primary | ICD-10-CM

## 2021-09-14 PROCEDURE — 98940 CHIROPRACT MANJ 1-2 REGIONS: CPT | Mod: AT | Performed by: CHIROPRACTOR

## 2021-09-14 NOTE — PROGRESS NOTES
Visit #:  9    Subjective:  Miladis Harper is a 26 year old female who is seen in f/u up for:        Cervicalgia  Segmental and somatic dysfunction of cervical region  Segmental and somatic dysfunction of thoracic region  Pain in thoracic spine  Chronic bilateral low back pain without sciatica.     Since last visit on 9/7/2021,  Miladis Harper reports: Symptoms relatively unchanged from last visit.  Patient is still experiencing pain throughout her neck and back.  Has found some relief through course of chiropractic care.  We will begin physical therapy with [facility] next week.    Dizzy symptoms appear to be decreasing in overall intensity and frequency according to the patient.  Patient was able to check her blood pressure which was scored at 118/77 mmHg.  This was corroborated by patient's  who is an RN and is in attendance on today's visit.    Area of chief complaint:  Cervical :  Symptoms are graded at 2-3/10. The quality is described as achey, and tight frequently.    Thoracic :  Symptoms are graded at 4/10. The quality is described as aching, pinching, pulling constantly.  Lumbar :  Symptoms are graded at 5-7/10. The quality is described as dull, pressure and pinching constantly.     Objective:  The following was observed:  Neck Disability Index (  Jeff H. and Wili C. 1991. All rights reserved.; used with permission) 9/14/2021   SECTION 1 - PAIN INTENSITY 1   SECTION 2 - PERSONAL CARE 1   SECTION 3 - LIFTING 0   SECTION 4 - READING 2   SECTION 5 - HEADACHES 1   SECTION 6 - CONCENTRATION 1   SECTION 7 - WORK 2   SECTION 8 - DRIVING 1   SECTION 9 - SLEEPING 1   SECTION 10 - RECREATION 1   Count 10   Sum 11   Raw Score: /50 11   Neck Disability Index Score: (%) 22     Increased from 20% when last assessed    Oswestry (CHRISTIE) Questionnaire    OSWESTRY DISABILITY INDEX 9/14/2021   Count 9   Sum 9   Oswestry Score (%) 20      Decreased from 37.78%    P: palpatory tendernessLeft side C4, T4 on  left, T8 midline:    A: static palpation demonstrates intersegmental asymmetry , cervical, thoracic  R: motion palpation notes restricted motion, C4 , T4  and T8   T: No apparent spasms, taut and tender fibers noted cervical paraspinal musculature on left, left rhomboids, left lower trapezius    Segmental spinal dysfunction/restrictions found at:  :  C4 Left lateral flexion restricted and Extension restriction  T4 Right rotation restricted and Extension restriction  T8 Extension restriction.      Assessment: Patient appears to be nearing MMI regarding current episode of care.  Progress can be seen through a decreased amount of segmental/somatic dysfunction present in the patient's spine as well as decreased scores of Oswestry index.  Neck disability index slightly increased from last assessment however this was only by 2 points.  Patient beginning physical therapy next week which I believe will serve to benefit the patient further.  Plan to follow-up with the patient as needed/due to acute exacerbation of symptoms.    Diagnoses:      1. Cervicalgia    2. Segmental and somatic dysfunction of cervical region    3. Segmental and somatic dysfunction of thoracic region    4. Pain in thoracic spine    5. Chronic bilateral low back pain without sciatica        Patient's condition:  Patient had restrictions pre-manipulation and Symptoms come and go    Treatment effectiveness:  Post manipulation there is better intersegmental movement and Patient states that they feel much better post manipulation but they gradually tighten up over time      Procedures:  CMT:  91261 Chiropractic manipulative treatment 3-4 regions performed   Cervical: Diversified, C4, Supine  Thoracic: Diversified, T4, T8, Prone    Modalities:  None performed this visit    Therapeutic procedures:  None    Response to Treatment  Reduction in symptoms as reported by patient    Prognosis: Good    Progress towards Goals: Patient is making progress towards the  goal.     Patient will report improved pain.              Patient will report able to recreate without painful limits.              Patient will demonstrate proper use of home care strategies              Patient will demonstrate an improved ability to complete Activities of Daily Living   as shown by a reported 10-30% reduced score on neck and/or back index.               Patient will demonstrate improved ROM    Recommendations:    Instructions:Monitor symptoms and perform activities as tolerated.    Follow-up: Discharged from current episode.

## 2022-03-27 ENCOUNTER — HEALTH MAINTENANCE LETTER (OUTPATIENT)
Age: 27
End: 2022-03-27

## 2022-03-29 ENCOUNTER — THERAPY VISIT (OUTPATIENT)
Dept: CHIROPRACTIC MEDICINE | Facility: OTHER | Age: 27
End: 2022-03-29
Attending: CHIROPRACTOR
Payer: COMMERCIAL

## 2022-03-29 VITALS — OXYGEN SATURATION: 98 % | HEART RATE: 84 BPM | TEMPERATURE: 99 F | RESPIRATION RATE: 16 BRPM

## 2022-03-29 DIAGNOSIS — M54.50 CHRONIC BILATERAL LOW BACK PAIN WITHOUT SCIATICA: ICD-10-CM

## 2022-03-29 DIAGNOSIS — M99.02 SEGMENTAL AND SOMATIC DYSFUNCTION OF THORACIC REGION: Primary | ICD-10-CM

## 2022-03-29 DIAGNOSIS — G89.29 CHRONIC BILATERAL LOW BACK PAIN WITHOUT SCIATICA: ICD-10-CM

## 2022-03-29 DIAGNOSIS — M54.6 PAIN IN THORACIC SPINE: ICD-10-CM

## 2022-03-29 DIAGNOSIS — M99.04 SEGMENTAL AND SOMATIC DYSFUNCTION OF SACRAL REGION: ICD-10-CM

## 2022-03-29 DIAGNOSIS — M79.10 MYALGIA: ICD-10-CM

## 2022-03-29 DIAGNOSIS — M54.2 CERVICALGIA: ICD-10-CM

## 2022-03-29 PROCEDURE — 98940 CHIROPRACT MANJ 1-2 REGIONS: CPT | Performed by: CHIROPRACTOR

## 2022-03-29 PROCEDURE — 99212 OFFICE O/P EST SF 10 MIN: CPT | Mod: 25 | Performed by: CHIROPRACTOR
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
## 2022-04-05 ENCOUNTER — THERAPY VISIT (OUTPATIENT)
Dept: CHIROPRACTIC MEDICINE | Facility: OTHER | Age: 27
End: 2022-04-05
Attending: CHIROPRACTOR
Payer: COMMERCIAL

## 2022-04-05 VITALS — SYSTOLIC BLOOD PRESSURE: 118 MMHG | HEART RATE: 84 BPM | RESPIRATION RATE: 16 BRPM | DIASTOLIC BLOOD PRESSURE: 80 MMHG

## 2022-04-05 DIAGNOSIS — M54.2 CERVICALGIA: ICD-10-CM

## 2022-04-05 DIAGNOSIS — M99.04 SEGMENTAL AND SOMATIC DYSFUNCTION OF SACRAL REGION: ICD-10-CM

## 2022-04-05 DIAGNOSIS — M99.02 SEGMENTAL AND SOMATIC DYSFUNCTION OF THORACIC REGION: Primary | ICD-10-CM

## 2022-04-05 DIAGNOSIS — G89.29 CHRONIC BILATERAL LOW BACK PAIN WITHOUT SCIATICA: ICD-10-CM

## 2022-04-05 DIAGNOSIS — M79.10 MYALGIA: ICD-10-CM

## 2022-04-05 DIAGNOSIS — M54.50 CHRONIC BILATERAL LOW BACK PAIN WITHOUT SCIATICA: ICD-10-CM

## 2022-04-05 DIAGNOSIS — M54.6 PAIN IN THORACIC SPINE: ICD-10-CM

## 2022-04-05 PROCEDURE — 98940 CHIROPRACT MANJ 1-2 REGIONS: CPT | Mod: AT | Performed by: CHIROPRACTOR

## 2022-04-05 NOTE — PROGRESS NOTES
Visit #:  2/6-8    Subjective:  Miladis Harper is a 26 year old female who is seen in f/u up for:        Segmental and somatic dysfunction of thoracic region  Segmental and somatic dysfunction of sacral region  Pain in thoracic spine  Chronic bilateral low back pain without sciatica  Cervicalgia  Myalgia.     Since last visit on 3/29/2022,  Miladis Harper reports: Symptoms showing some improvement since last visit.  Reports that she was somewhat sore and headachy initially following treatment but that this subsided and overall pain levels primarily of the mid and lower back are decreasing as well as the overall intensity and quality.  Patient does voice some interest in possibly pursuing acupuncture again.  This was discussed on today's visit.    Area of chief complaint:  Cervical :  Symptoms are graded at 2/10. The quality is described as pinching.    Thoracic/Lumbar :  Symptoms are graded at 4/10. The quality is described as pinching.      (DVPRS) Pain Rating Score : (not recorded)     Objective:  The following was observed:  /80   Pulse 84   Resp 16      P: palpatory tenderness Right intrascapular T-spine, PSIS bilaterally, TL junction:    A: static palpation demonstrates intersegmental asymmetry , thoracic, pelvis  R: motion palpation notes restricted motion, T2, T3 , T12  and Sacrum   T: muscle spasm at level(s): Present throughout paraspinal musculature cervical, thoracic, lumbar regions bilaterally, upper trapezius bilaterally, right rhomboids, left quadratus lumborum:       Segmental spinal dysfunction/restrictions found at:  :  T2 Right lateral flexion restricted and Extension restriction  T3 Right lateral flexion restricted and Extension restriction  T12 Extension restriction  Sacrum Left lateral flexion restricted and Extension restriction.    Assessment: Patient symptoms are showing some improvement.  Discussed utilizing chiropractic acupuncture to help primarily with pain as well as stress  associated with pain.  In theory this does help to cause relaxation of the whole body which could in theory help with patient's struggle with pregnancy.  Discussed at length that this is not a guarantee but it will not hurt and could possibly help with pain and ongoing other concerns associated with pain.  Encourage patient to discuss this with her spouse prior to beginning this treatment.  Plan to follow-up with patient within 1 week, or sooner if patient so chooses.    Diagnoses:      1. Segmental and somatic dysfunction of thoracic region    2. Segmental and somatic dysfunction of sacral region    3. Pain in thoracic spine    4. Chronic bilateral low back pain without sciatica    5. Cervicalgia    6. Myalgia        Patient's condition:  Patient had restrictions pre-manipulation and Patient symptoms are gradually improving    Treatment effectiveness:  Post manipulation there is better intersegmental movement, Patient claims to feel looser post manipulation and Patients symptoms are getting better      Procedures:  CMT:  80191 Chiropractic manipulative treatment 1-2 regions performed   Thoracic: Diversified, T2, T3, T12, Prone  Pelvis: Drop Table, Sacrum , Prone    Modalities:  None performed this visit    Therapeutic procedures:  None    Response to Treatment  Reduction in symptoms as reported by patient    Prognosis: Good    Progress towards Goals: Patient is making progress towards the goal.   Reduce pain 25-45%  Reduce positive Ortho neuro findings  Decrease disability index scores 20-30%  Recommendations:    Instructions:consider acupuncture    Follow-up:  Return to care within 1 week.

## 2022-04-14 ENCOUNTER — THERAPY VISIT (OUTPATIENT)
Dept: CHIROPRACTIC MEDICINE | Facility: OTHER | Age: 27
End: 2022-04-14
Attending: CHIROPRACTOR
Payer: COMMERCIAL

## 2022-04-14 VITALS
HEART RATE: 88 BPM | OXYGEN SATURATION: 98 % | DIASTOLIC BLOOD PRESSURE: 72 MMHG | TEMPERATURE: 97.9 F | SYSTOLIC BLOOD PRESSURE: 112 MMHG | RESPIRATION RATE: 16 BRPM

## 2022-04-14 DIAGNOSIS — M99.02 SEGMENTAL AND SOMATIC DYSFUNCTION OF THORACIC REGION: Primary | ICD-10-CM

## 2022-04-14 DIAGNOSIS — M99.04 SEGMENTAL AND SOMATIC DYSFUNCTION OF SACRAL REGION: ICD-10-CM

## 2022-04-14 DIAGNOSIS — M54.6 PAIN IN THORACIC SPINE: ICD-10-CM

## 2022-04-14 DIAGNOSIS — M54.2 CERVICALGIA: ICD-10-CM

## 2022-04-14 DIAGNOSIS — M54.50 CHRONIC BILATERAL LOW BACK PAIN WITHOUT SCIATICA: ICD-10-CM

## 2022-04-14 DIAGNOSIS — G89.29 CHRONIC BILATERAL LOW BACK PAIN WITHOUT SCIATICA: ICD-10-CM

## 2022-04-14 DIAGNOSIS — M79.10 MYALGIA: ICD-10-CM

## 2022-04-14 PROCEDURE — 98940 CHIROPRACT MANJ 1-2 REGIONS: CPT | Mod: AT | Performed by: CHIROPRACTOR

## 2022-04-14 PROCEDURE — 97810 ACUP 1/> WO ESTIM 1ST 15 MIN: CPT | Performed by: CHIROPRACTOR

## 2022-04-14 NOTE — PROGRESS NOTES
Neck is occasional subtle aching. 2/10 W24 2/10. Right upper back is is constant aching. 5/10 W24 7/10. Massage has been helping a little bit. Bilateral lower back is constantly tight. Radiating down right leg. 2/10 W24 2/10.   Caryl Ryan on 4/14/2022 at 3:13 PM    Reviewed by EW    Visit #:  3/6-8    Subjective:  Miladis Harper is a 26 year old female who is seen in f/u up for:        Segmental and somatic dysfunction of thoracic region  Segmental and somatic dysfunction of sacral region  Pain in thoracic spine  Chronic bilateral low back pain without sciatica  Cervicalgia  Myalgia.     Since last visit on 4/5/2022,  Miladis Harper reports: Symptoms did well following last treatment.  Patient states that symptoms seem to increase over the past 24 hours, no specific causation such as traumatic event or overuse injury.    Reports that she is continuing to work on dietary changes and walk more.  Discussed SMART goals regarding both physical activity and dietary changes.    Patient accompanied by her  today.  Acupuncture therapy will also start on today's visit.    (DVPRS) Pain Rating Score : Notice pain, does not interfere with activities (W24 2/10) (04/14/22 1511)     Objective:  The following was observed:  /72 (BP Location: Right arm, Patient Position: Sitting, Cuff Size: Adult Regular)   Pulse 88   Temp 97.9  F (36.6  C) (Tympanic)   Resp 16   SpO2 98%      P: palpatory tenderness Right side T4, right side T8, PSIS on left:    A: static palpation demonstrates intersegmental asymmetry , thoracic, pelvis  R: motion palpation notes restricted motion, T4 , T8  and Sacrum   T: muscle spasm at level(s): Mild spasms cervical paraspinals, paraspinals of the thoracic and lumbar regions bilaterally, quadratus lumborum on left:      Segmental spinal dysfunction/restrictions found at:  :  T4 Right lateral flexion restricted and Extension restriction  T8 Right lateral flexion restricted and Extension  restriction  Sacrum Left lateral flexion restricted and Extension restriction.      Assessment: Patient is showing good signs of progress.  Acupuncture will be incorporated into care plan at this time.  This will be done in efforts to help reduce overall pain, and any emotions associated with these.  On prior visit with patient acupuncture was discussed regarding fertility.  While we are not treating fertility issues with acupuncture we are hoping to reduce patient's stress in efforts to give her body a better chance to conceive.  This was discussed at length with patient that this is still a 50-50 shot but the benefits are more numerous especially regarding pain and overall mental wellbeing.    Diagnoses:      1. Segmental and somatic dysfunction of thoracic region    2. Segmental and somatic dysfunction of sacral region    3. Pain in thoracic spine    4. Chronic bilateral low back pain without sciatica    5. Cervicalgia    6. Myalgia        Patient's condition:  Patient had restrictions pre-manipulation and Patient symptoms are gradually improving    Treatment effectiveness:  Post manipulation there is better intersegmental movement and Patient states that they feel much better post manipulation but they gradually tighten up over time      Procedures:  CMT:  56904 Chiropractic manipulative treatment 1-2 regions performed   Thoracic: Diversified, T4, T8, Prone  Pelvis: Drop Table, Sacrum , Prone    Modalities:  41080: Acupuncture, for 15 minutes:  Points: Bl 10, 23, 25, 47, 60, SP6, LI 4, GV 21, Ki 3,   For 15 minutes    Therapeutic procedures:  None    Response to Treatment  Reduction in symptoms as reported by patient    Prognosis: Good    Progress towards Goals: Patient is making progress towards the goal.   Reduce pain 25-45%  Reduce positive Ortho neuro findings  Decrease disability index scores 20-30%    Recommendations:    Instructions:monitor symptoms closely    Follow-up:  Return to care in 1 week.

## 2022-04-22 ENCOUNTER — THERAPY VISIT (OUTPATIENT)
Dept: CHIROPRACTIC MEDICINE | Facility: OTHER | Age: 27
End: 2022-04-22
Attending: CHIROPRACTOR
Payer: COMMERCIAL

## 2022-04-22 VITALS
OXYGEN SATURATION: 99 % | DIASTOLIC BLOOD PRESSURE: 78 MMHG | HEART RATE: 88 BPM | SYSTOLIC BLOOD PRESSURE: 118 MMHG | RESPIRATION RATE: 16 BRPM | TEMPERATURE: 97.9 F

## 2022-04-22 DIAGNOSIS — M54.2 CERVICALGIA: ICD-10-CM

## 2022-04-22 DIAGNOSIS — M79.10 MYALGIA: ICD-10-CM

## 2022-04-22 DIAGNOSIS — M99.02 SEGMENTAL AND SOMATIC DYSFUNCTION OF THORACIC REGION: Primary | ICD-10-CM

## 2022-04-22 DIAGNOSIS — M54.6 PAIN IN THORACIC SPINE: ICD-10-CM

## 2022-04-22 DIAGNOSIS — G89.29 CHRONIC BILATERAL LOW BACK PAIN WITHOUT SCIATICA: ICD-10-CM

## 2022-04-22 DIAGNOSIS — M54.50 CHRONIC BILATERAL LOW BACK PAIN WITHOUT SCIATICA: ICD-10-CM

## 2022-04-22 PROCEDURE — 98940 CHIROPRACT MANJ 1-2 REGIONS: CPT | Mod: AT | Performed by: CHIROPRACTOR

## 2022-04-22 PROCEDURE — 97810 ACUP 1/> WO ESTIM 1ST 15 MIN: CPT | Performed by: CHIROPRACTOR

## 2022-04-22 NOTE — PROGRESS NOTES
Neck is constant dull aching. 2/10 W24 2/10. Using tub baths, walking, and doing stretches. These are helping. Right side upper back is frequent pinching. 4/10 W24 7/10. Using tub baths, walking, and doing stretches. These are helping.  Bilateral lower back is frequent dull aching. 2/10 W24 2/10. Using tub baths, walking, and doing stretches. These are helping.   Caryl Ryan on 4/22/2022 at 11:31 AM    Reviewed by EW    Visit #:  4/6-8    Subjective:  Miladis Harper is a 26 year old female who is seen in f/u up for:        Segmental and somatic dysfunction of thoracic region  Pain in thoracic spine  Chronic bilateral low back pain without sciatica  Cervicalgia  Myalgia.     Since last visit on 4/14/2022,  Miladis Harper reports: Following last encounter with application of acupuncture notes that she felt much more relaxed especially for approximately 1 day.  Symptoms did seem to improve and stay improved over the past week.  Hands more sore post acupuncture. Feels like tendonitis, has had similar symptoms like this in the past which seem to respond with massage.    (DVPRS) Pain Rating Score : Notice pain, does not interfere with activities (W24 2/10) (04/22/22 1129)     Objective:  The following was observed:  /78 (BP Location: Right arm, Patient Position: Sitting, Cuff Size: Adult Regular)   Pulse 88   Temp 97.9  F (36.6  C) (Tympanic)   Resp 16   SpO2 99%      P: palpatory tendernessNone reported:    A: static palpation demonstrates intersegmental asymmetry , thoracic  R: motion palpation notes restricted motion, T1 , T2  and T6   T: muscle spasm at level(s): Suboccipitals, cervical paraspinals bilaterally, upper trapezius and T-spine paraspinals bilaterally, mild of the quadratus lumborum bilaterally:      Segmental spinal dysfunction/restrictions found at:  :  T1 Right lateral flexion restricted and Extension restriction  T2 Left lateral flexion restricted and Extension restriction  T6  Extension restriction.      Assessment: Patient appears to be showing benefit after first application of acupuncture.  At this time patient is still in initial trial which is allowing for up to 4 acupuncture visits.  Once again if benefit is noted both subjectively and objectively with application of acupuncture continue course of chiropractic care with acupuncture.  However if no measurable progress is seen after initial for visit trial discontinue acupuncture.    Diagnoses:      1. Segmental and somatic dysfunction of thoracic region    2. Pain in thoracic spine    3. Chronic bilateral low back pain without sciatica    4. Cervicalgia    5. Myalgia        Patient's condition:  Patient had restrictions pre-manipulation, Patient symptoms are gradually improving and Symptoms come and go    Treatment effectiveness:  Post manipulation there is better intersegmental movement and Patient states that they feel much better post manipulation but they gradually tighten up over time      Procedures:  CMT:  87665 Chiropractic manipulative treatment 1-2 regions performed   Thoracic: Diversified, T1, T2, T6, Prone    Modalities:  09550: Acupuncture, for 15 minutes:  Points:GV 14, 21, Bl 10, 20, 21, 22, 23, 24, 25, 46, 47 Sp 6  For 15 minutes    Therapeutic procedures:  None    Response to Treatment  Reduction in symptoms as reported by patient    Prognosis: Good    Progress towards Goals: Patient is making progress towards the goal.   Reduce pain 25-45%  Reduce positive Ortho neuro findings  Decrease disability index scores 20-30%  Recommendations:    Instructions:monitor symptoms and perform activities as tolerated    Follow-up:  Return to care in 1 week.

## 2022-04-28 ENCOUNTER — THERAPY VISIT (OUTPATIENT)
Dept: CHIROPRACTIC MEDICINE | Facility: OTHER | Age: 27
End: 2022-04-28
Attending: CHIROPRACTOR
Payer: COMMERCIAL

## 2022-04-28 VITALS — HEART RATE: 88 BPM | RESPIRATION RATE: 16 BRPM | TEMPERATURE: 98.3 F | OXYGEN SATURATION: 99 %

## 2022-04-28 DIAGNOSIS — M79.10 MYALGIA: ICD-10-CM

## 2022-04-28 DIAGNOSIS — M99.02 SEGMENTAL AND SOMATIC DYSFUNCTION OF THORACIC REGION: Primary | ICD-10-CM

## 2022-04-28 DIAGNOSIS — M54.6 PAIN IN THORACIC SPINE: ICD-10-CM

## 2022-04-28 DIAGNOSIS — M54.2 CERVICALGIA: ICD-10-CM

## 2022-04-28 DIAGNOSIS — G89.29 CHRONIC BILATERAL LOW BACK PAIN WITHOUT SCIATICA: ICD-10-CM

## 2022-04-28 DIAGNOSIS — M54.50 CHRONIC BILATERAL LOW BACK PAIN WITHOUT SCIATICA: ICD-10-CM

## 2022-04-28 PROCEDURE — 97810 ACUP 1/> WO ESTIM 1ST 15 MIN: CPT | Performed by: CHIROPRACTOR

## 2022-04-28 PROCEDURE — 98940 CHIROPRACT MANJ 1-2 REGIONS: CPT | Mod: AT | Performed by: CHIROPRACTOR

## 2022-04-28 NOTE — PROGRESS NOTES
Right upper back is frequent pinching. 3/10 W24 4/10. Using massages, heat, tub baths, and stretches. These provide a decrease in the tightness.  Caryl Ryan on 4/28/2022 at 2:31 PM    Reviewed by EW    Visit #:  5/6-8    Subjective:  Miladis Harper is a 26 year old female who is seen in f/u up for:        Segmental and somatic dysfunction of thoracic region  Pain in thoracic spine  Chronic bilateral low back pain without sciatica  Cervicalgia  Myalgia.     Since last visit on 4/22/2022,  Miladis Harper reports: back seems to be better, less frequent and intense symptoms.    Able to lay on stomach with less pain, has not been able to do so in quite some time since utilizing acupuncture treatments.    I am informed by my assistant Caryl Ryan that the patient is having more struggles with making her appointments.    (DVPRS) Pain Rating Score : Sometimes distracts me (W24 4/10) (04/28/22 1428)     Objective:  The following was observed:  Pulse 88   Temp 98.3  F (36.8  C) (Tympanic)   Resp 16   SpO2 99%      P: palpatory tendernessRhomboids and T-spine paraspinal Bilaterally  A: static palpation demonstrates intersegmental asymmetry , thoracic  R: motion palpation notes restricted motion, T4  and T9   T: Spasms noted T-spine paraspinals bilaterally, upper trapezius bilaterally, suboccipitals bilaterally and cervical paraspinals bilaterally    Segmental spinal dysfunction/restrictions found at:  :  T4 Right lateral flexion restricted and Extension restriction  T9 Extension restriction.      Assessment: As of right now acupuncture does seem to be providing patient benefit regarding ongoing back pain.  I do believe that additional treatments would be beneficial for the patient however if she is struggling with making appointments continuation of care would likely not benefit especially as with acupuncture we are attempting to reduce the amount of stress that she is experiencing.  I would like to try to  follow-up with her in about a week however I will leave this up to the patient if she chooses to return or take a break from treatment.    Diagnoses:      1. Segmental and somatic dysfunction of thoracic region    2. Pain in thoracic spine    3. Chronic bilateral low back pain without sciatica    4. Cervicalgia    5. Myalgia        Patient's condition:  Patient had restrictions pre-manipulation and Patient symptoms are gradually improving    Treatment effectiveness:  Post manipulation there is better intersegmental movement and Patient claims to feel looser post manipulation      Procedures:  CMT:  34051 Chiropractic manipulative treatment 1-2 regions performed   Thoracic: Diversified, T4, T9, Prone    Modalities:  11261: Acupuncture, for 15 minutes:  Points: Bl 10, 25, 46, 47, GV 4, 5, 6, GB 20  For 15 minutes    Therapeutic procedures:  None    Response to Treatment  Reduction in symptoms as reported by patient    Prognosis: Good    Progress towards Goals: in progress  Reduce pain 25-45%  Reduce positive Ortho neuro findings  Decrease disability index scores 20-30%    Recommendations:    Instructions:rest    Follow-up:  Return to care in 1 week or take a break from treatment for 1 week.

## 2022-05-02 NOTE — PATIENT INSTRUCTIONS
Instructions:rest    Follow-up:  Return to care in 1 week or take a break from treatment for 1 week.    5

## 2022-07-21 ENCOUNTER — VIRTUAL VISIT (OUTPATIENT)
Dept: OBGYN | Facility: OTHER | Age: 27
End: 2022-07-21
Attending: OBSTETRICS & GYNECOLOGY
Payer: COMMERCIAL

## 2022-07-21 VITALS — WEIGHT: 205 LBS | HEIGHT: 66 IN | BODY MASS INDEX: 32.95 KG/M2

## 2022-07-21 DIAGNOSIS — Z32.01 PREGNANCY TEST POSITIVE: Primary | ICD-10-CM

## 2022-07-21 PROBLEM — O99.113 BENIGN GESTATIONAL THROMBOCYTOPENIA IN THIRD TRIMESTER (H): Status: ACTIVE | Noted: 2021-07-12

## 2022-07-21 PROBLEM — Z86.32 HISTORY OF GESTATIONAL DIABETES: Status: ACTIVE | Noted: 2021-07-23

## 2022-07-21 PROBLEM — F34.1 PERSISTENT DEPRESSIVE DISORDER: Status: ACTIVE | Noted: 2021-07-28

## 2022-07-21 PROBLEM — R73.01 ELEVATED FASTING BLOOD SUGAR: Status: ACTIVE | Noted: 2021-05-20

## 2022-07-21 PROBLEM — R82.4 KETONURIA: Status: ACTIVE | Noted: 2022-07-21

## 2022-07-21 PROBLEM — O35.HXX0 CLUB FOOT OF FETUS AFFECTING ANTEPARTUM CARE OF MOTHER: Status: ACTIVE | Noted: 2021-06-14

## 2022-07-21 PROBLEM — O36.4XX0 IUFD AT 20 WEEKS OR MORE OF GESTATION: Status: ACTIVE | Noted: 2021-07-12

## 2022-07-21 PROBLEM — O13.3 GESTATIONAL HYPERTENSION, THIRD TRIMESTER: Status: ACTIVE | Noted: 2022-07-21

## 2022-07-21 PROBLEM — D69.6 BENIGN GESTATIONAL THROMBOCYTOPENIA IN THIRD TRIMESTER (H): Status: ACTIVE | Noted: 2021-07-12

## 2022-07-21 PROBLEM — F31.81 BIPOLAR 2 DISORDER (H): Status: ACTIVE | Noted: 2021-11-30

## 2022-07-21 PROBLEM — R73.03 PREDIABETES: Status: ACTIVE | Noted: 2022-03-23

## 2022-07-21 PROBLEM — O09.90 SUPERVISION OF HIGH-RISK PREGNANCY: Status: ACTIVE | Noted: 2021-05-27

## 2022-07-21 PROBLEM — O24.414 INSULIN CONTROLLED GESTATIONAL DIABETES MELLITUS (GDM) IN THIRD TRIMESTER: Status: ACTIVE | Noted: 2021-06-04

## 2022-07-21 PROBLEM — O40.3XX0 POLYHYDRAMNIOS IN THIRD TRIMESTER: Status: ACTIVE | Noted: 2021-05-20

## 2022-07-21 PROCEDURE — 99207 PR OB VISIT-NO CHARGE - GICH ONLY: CPT | Mod: 95

## 2022-07-21 ASSESSMENT — PATIENT HEALTH QUESTIONNAIRE - PHQ9: SUM OF ALL RESPONSES TO PHQ QUESTIONS 1-9: 14

## 2022-07-21 NOTE — PROGRESS NOTES
Verbal consent obtained for telephone visit. Total length of call: 25 min    HPI:    This is a 27 year old female patient,  who was called today for OB Intake visit. Patient reports positive pregnancy test at home.     Obstetrical history and OB Demographics updated to the best of this nurse's ability based on patient report. PHQ-9 depression screening and routine Domestic Abuse screening completed. All immediate questions and concerns answered. Patient's PHQ-9 score was 14.     FOOD SECURITY SCREENING QUESTIONS  Hunger Vital Signs:  Within the past 12 months we worried whether our food would run out before we got money to buy more. Never  Within the past 12 months the food we bought just didn't last and we didn't have money to get more. Never    Last menstrual period is reported as Patient's last menstrual period was 2022. MANUEL based on LMP is Estimated Date of Delivery: Mar 2, 2023.  Her cycles are regular.  Her last menstrual period was normal.   Since her LMP, she has experienced  nausea, fatigue, loss of appetite, vaginal discharge, urinary urgency, urinary frequency and constipation.       OBSTETRIC HISTORY:    OB History    Para Term  AB Living   4 1 1 0 2 0   SAB IAB Ectopic Multiple Live Births   2 0 0 0 0      # Outcome Date GA Lbr Han/2nd Weight Sex Delivery Anes PTL Lv   4 Current            3 SAB 22     AB, MISSED      2 Term 21 37w0d 03:39 / 02:13 2.721 kg (6 lb)  Vag-Spont Local N FD      Name: ALTON HURTADOJAMIR WALL      Apgar1: 0  Apgar5: 0   1 SAB 19 13w0d              Age of first pregnancy: 24   Previous OB Provider: Team of OB doctors at the [facility]  Previous Delivering Clinic: [facility]  Release of Records: None    Current delivery plan: GICH  Preferred OB Provider: NASEEM   Current Primary Care Provider: None  Pediatrician: None    Additional History: Her fetal demise was inconclusive. Patient had  increased amniotic fluid that she had drained weekly. Baby was underdeveloped.      Have you travelled during the pregnancy?No  Have your sexual partner(s) travelled during the pregnancy?No    HISTORY:   Planned Pregnancy: Yes  Marital Status:   Occupation: Not working   Living in Household: Spouse    Father of the baby is involved.   Family and father of baby is supportive of current pregnancy.  Past Medical History of Father of Baby:No significant medical history    Past History:  Her past medical history No past medical history on file..      Her past surgical history: No past surgical history on file.    She has a history of  preeclampsia, gestational diabetes, diet controlled and gestational diabetes, insulin controlled    Since her last LMP she denies use of alcohol, tobacco and street drugs.    Pap smear history: YES - updated in Problem List and Health Maintenance accordingly    STD/STI history: No STD history    STD/STI symptoms: no noticeable symptoms     Past medical, surgical, social and family history were reviewed and updated in EPIC.    Medications reviewed by this nurse. Current medication list:  Current Outpatient Medications   Medication Sig Dispense Refill     Prenatal Vit-Fe Fumarate-FA (PRENATAL MULTIVITAMIN  PLUS IRON) 27-1 MG TABS Take 1 tablet by mouth daily       LEVEMIR FLEXTOUCH 100 UNIT/ML pen INJECT 10 UNITS SUBCUTANEOUS BEFORE BEDTIME. INCREASE AS NEEDED (Patient not taking: Reported on 7/21/2022)       ondansetron (ZOFRAN-ODT) 4 MG ODT tab PLACE ONE TABLET ON THE TONGUE EVERY 8 HOURS AS NEEDED NAUSEA (Patient not taking: Reported on 7/21/2022)       sertraline (ZOLOFT) 50 MG tablet Take 50 mg by mouth (Patient not taking: Reported on 7/21/2022)       The following medications were recommended to be discontinued due to Pregnancy Category D status: None  Patient informed to contact her primary care provider as soon as possible to discuss a safer alternative.    Risk  factors:  Moderate and moderately severe risks (consult with OB/Gyn)  Previous fetal or  demise: Yes  History of  delivery: No  History of heart disease Class I: No  Severe anemia, unresponsive to iron therapy: No  Pelvic mass or neoplasm: No  Previous : No  Hyper/hypothyroidism: No  History of postpartum hemorrhage requiring transfusion:No  History of Placenta Accreta: No    High Risk (Pregnancy managed by OB/Gyn)  Multiple pregnancy: No  Pre-gestational diabetes: No  Chronic Hypertension: No  Renal Failure: No  Heart disease, class II or greater: No  Rh Isoimmunization: No  Chronic active hepatitis: No  Convulsive disorder, poorly controlled: No  Isoimmune thrombocytopenia: No  Pre-term premature rupture of membranes: No  Lupus or other autoimmune disorder: No  Human Immunodeficiency Virus: No      ASSESSMENT/PLAN:       ICD-10-CM    1. Pregnancy test positive  Z32.01  OB < 14 Weeks Single       27 year old , Unknown of pregnancy with MANUEL of Not found.    Per standing orders and scope of practice of this nurse, patient will have the following orders placed and completed prior to initial OB visit with the appropriate provider:    --early ultrasound for dating and viability ordered for 6+ weeks gestation based on LMP    --Quantitative Beta HCG and progesterone monitoring if indicated    Counseling given:     - Recommended weight gain for pregnancy: < 15 lbs.   BMI < 18.5  28-40 lbs   18.5 - 24.9 25-35   25 - 29.9 15-25   > 30  < 15       PLAN/PATIENT INSTRUCTIONS:    Normal exercise.  Normal sexual activity.  Prenatal vitamins.  Anticipated weight gain.    follow-up appointment with Dr. Noel for pre- care and take multivitamin or pre- vitamins.    Evie Baker RN.................................................. 2022 2:31 PM

## 2022-08-05 ENCOUNTER — HOSPITAL ENCOUNTER (OUTPATIENT)
Dept: ULTRASOUND IMAGING | Facility: OTHER | Age: 27
Discharge: HOME | End: 2022-08-05
Attending: OBSTETRICS & GYNECOLOGY | Admitting: OBSTETRICS & GYNECOLOGY
Payer: COMMERCIAL

## 2022-08-05 DIAGNOSIS — Z32.01 PREGNANCY TEST POSITIVE: ICD-10-CM

## 2022-08-05 PROCEDURE — 76801 OB US < 14 WKS SINGLE FETUS: CPT

## 2022-08-08 ENCOUNTER — PRENATAL OFFICE VISIT (OUTPATIENT)
Dept: OBGYN | Facility: OTHER | Age: 27
End: 2022-08-08
Attending: FAMILY MEDICINE
Payer: COMMERCIAL

## 2022-08-08 VITALS
WEIGHT: 204.5 LBS | SYSTOLIC BLOOD PRESSURE: 120 MMHG | HEART RATE: 94 BPM | BODY MASS INDEX: 33.01 KG/M2 | DIASTOLIC BLOOD PRESSURE: 70 MMHG

## 2022-08-08 DIAGNOSIS — Z34.80 SUPERVISION OF OTHER NORMAL PREGNANCY, ANTEPARTUM: Primary | ICD-10-CM

## 2022-08-08 LAB
ABO/RH(D): NORMAL
ALBUMIN UR-MCNC: NEGATIVE MG/DL
ANTIBODY SCREEN: NEGATIVE
APPEARANCE UR: CLEAR
BACTERIA #/AREA URNS HPF: ABNORMAL /HPF
BASOPHILS # BLD AUTO: 0 10E3/UL (ref 0–0.2)
BASOPHILS NFR BLD AUTO: 0 %
BILIRUB UR QL STRIP: NEGATIVE
C TRACH DNA SPEC QL PROBE+SIG AMP: NEGATIVE
COLOR UR AUTO: YELLOW
EOSINOPHIL # BLD AUTO: 0 10E3/UL (ref 0–0.7)
EOSINOPHIL NFR BLD AUTO: 0 %
ERYTHROCYTE [DISTWIDTH] IN BLOOD BY AUTOMATED COUNT: 13.9 % (ref 10–15)
GLUCOSE UR STRIP-MCNC: 250 MG/DL
HCT VFR BLD AUTO: 39.3 % (ref 35–47)
HGB BLD-MCNC: 12.9 G/DL (ref 11.7–15.7)
HGB UR QL STRIP: NEGATIVE
IMM GRANULOCYTES # BLD: 0.1 10E3/UL
IMM GRANULOCYTES NFR BLD: 1 %
KETONES UR STRIP-MCNC: NEGATIVE MG/DL
LEUKOCYTE ESTERASE UR QL STRIP: ABNORMAL
LYMPHOCYTES # BLD AUTO: 2 10E3/UL (ref 0.8–5.3)
LYMPHOCYTES NFR BLD AUTO: 28 %
MCH RBC QN AUTO: 25.3 PG (ref 26.5–33)
MCHC RBC AUTO-ENTMCNC: 32.8 G/DL (ref 31.5–36.5)
MCV RBC AUTO: 77 FL (ref 78–100)
MONOCYTES # BLD AUTO: 0.4 10E3/UL (ref 0–1.3)
MONOCYTES NFR BLD AUTO: 5 %
MUCOUS THREADS #/AREA URNS LPF: PRESENT /LPF
N GONORRHOEA DNA SPEC QL NAA+PROBE: NEGATIVE
NEUTROPHILS # BLD AUTO: 4.7 10E3/UL (ref 1.6–8.3)
NEUTROPHILS NFR BLD AUTO: 66 %
NITRATE UR QL: NEGATIVE
NRBC # BLD AUTO: 0 10E3/UL
NRBC BLD AUTO-RTO: 0 /100
PH UR STRIP: 5.5 [PH] (ref 5–9)
PLATELET # BLD AUTO: 163 10E3/UL (ref 150–450)
RBC # BLD AUTO: 5.09 10E6/UL (ref 3.8–5.2)
RBC URINE: 2 /HPF
SP GR UR STRIP: 1.01 (ref 1–1.03)
SPECIMEN EXPIRATION DATE: NORMAL
SQUAMOUS EPITHELIAL: 6 /HPF
TSH SERPL DL<=0.005 MIU/L-ACNC: 3.4 MU/L (ref 0.4–4)
UROBILINOGEN UR STRIP-MCNC: NORMAL MG/DL
WBC # BLD AUTO: 7.2 10E3/UL (ref 4–11)
WBC URINE: 3 /HPF

## 2022-08-08 PROCEDURE — 86850 RBC ANTIBODY SCREEN: CPT | Mod: ZL | Performed by: OBSTETRICS & GYNECOLOGY

## 2022-08-08 PROCEDURE — 87491 CHLMYD TRACH DNA AMP PROBE: CPT | Mod: ZL | Performed by: OBSTETRICS & GYNECOLOGY

## 2022-08-08 PROCEDURE — 85025 COMPLETE CBC W/AUTO DIFF WBC: CPT | Mod: ZL | Performed by: OBSTETRICS & GYNECOLOGY

## 2022-08-08 PROCEDURE — 87389 HIV-1 AG W/HIV-1&-2 AB AG IA: CPT | Mod: ZL | Performed by: OBSTETRICS & GYNECOLOGY

## 2022-08-08 PROCEDURE — 86803 HEPATITIS C AB TEST: CPT | Mod: ZL | Performed by: OBSTETRICS & GYNECOLOGY

## 2022-08-08 PROCEDURE — 99207 PR OB VISIT-NO CHARGE - GICH ONLY: CPT | Performed by: OBSTETRICS & GYNECOLOGY

## 2022-08-08 PROCEDURE — 86780 TREPONEMA PALLIDUM: CPT | Mod: ZL | Performed by: OBSTETRICS & GYNECOLOGY

## 2022-08-08 PROCEDURE — 86762 RUBELLA ANTIBODY: CPT | Mod: ZL | Performed by: OBSTETRICS & GYNECOLOGY

## 2022-08-08 PROCEDURE — 84443 ASSAY THYROID STIM HORMONE: CPT | Mod: ZL | Performed by: OBSTETRICS & GYNECOLOGY

## 2022-08-08 PROCEDURE — 87340 HEPATITIS B SURFACE AG IA: CPT | Mod: ZL | Performed by: OBSTETRICS & GYNECOLOGY

## 2022-08-08 PROCEDURE — 87086 URINE CULTURE/COLONY COUNT: CPT | Mod: ZL | Performed by: OBSTETRICS & GYNECOLOGY

## 2022-08-08 PROCEDURE — 81001 URINALYSIS AUTO W/SCOPE: CPT | Mod: ZL | Performed by: OBSTETRICS & GYNECOLOGY

## 2022-08-08 PROCEDURE — 36415 COLL VENOUS BLD VENIPUNCTURE: CPT | Mod: ZL | Performed by: OBSTETRICS & GYNECOLOGY

## 2022-08-08 ASSESSMENT — PAIN SCALES - GENERAL: PAINLEVEL: NO PAIN (0)

## 2022-08-08 ASSESSMENT — PATIENT HEALTH QUESTIONNAIRE - PHQ9: SUM OF ALL RESPONSES TO PHQ QUESTIONS 1-9: 11

## 2022-08-08 NOTE — NURSING NOTE
"Chief Complaint   Patient presents with     Prenatal Care     New OB        Initial /70   Pulse 94   Wt 92.8 kg (204 lb 8 oz)   LMP 05/26/2022   BMI 33.01 kg/m   Estimated body mass index is 33.01 kg/m  as calculated from the following:    Height as of 7/21/22: 1.676 m (5' 6\").    Weight as of this encounter: 92.8 kg (204 lb 8 oz).  Medication Reconciliation: complete    FOOD SECURITY SCREENING QUESTIONS  Hunger Vital Signs:  Within the past 12 months we worried whether our food would run out before we got money to buy more. Never  Within the past 12 months the food we bought just didn't last and we didn't have money to get more. Never  Anyi Doan LPN 8/8/2022 2:36 PM             Anyi Doan LPN  "

## 2022-08-08 NOTE — PROGRESS NOTES
CC: New OB visit  HPI:  Miladis Harper is  at 9w2d based on Patient's last menstrual period was 2022./first trimester US.  She notes issues of nausea and breast tenderness. Prior fetal demise at term at [facility] at 37 weeks.  She had polyhydramnios with neg workup, normal genetics.  She had GDM in the pregnancy on Levmir.  She has history of two prior SAB's in the first trimester.    OB History    Para Term  AB Living   4 1 1 0 2 0   SAB IAB Ectopic Multiple Live Births   2 0 0 0 0      # Outcome Date GA Lbr Han/2nd Weight Sex Delivery Anes PTL Lv   4 Current            3 SAB 22     AB, MISSED      2 Term 21 37w0d 03:39 / 02:13 2.721 kg (6 lb)  Vag-Spont Local N FD      Name: WENDY HARPER FD      Apgar1: 0  Apgar5: 0   1 SAB 19 13w0d            STI: (denies HSV, Hep C, Hep B, HIV, Syphilis, Chlamydia, Gonorrhea)  Last pap smear: No results found for: PAP  Chickenpox history: had disease  Past Medical History:   Diagnosis Date     Depressive disorder      Gestational diabetes       has no past surgical history on file.    Social History     Tobacco Use     Smoking status: Never Smoker     Smokeless tobacco: Never Used   Vaping Use     Vaping Use: Never used   Substance Use Topics     Alcohol use: Never     Drug use: Never     Family History   Problem Relation Age of Onset     Diabetes Type 2  Father      Ataxia Sister      Cancer Maternal Grandmother      Current Outpatient Medications   Medication     Prenatal Vit-Fe Fumarate-FA (PRENATAL MULTIVITAMIN  PLUS IRON) 27-1 MG TABS     No current facility-administered medications for this visit.     No Known Allergies    There is no immunization history on file for this patient.    REVIEW OF SYSTEMS  Neg except as above    EXAM: /70   Pulse 94   Wt 92.8 kg (204 lb 8 oz)   LMP 2022   BMI 33.01 kg/m    Gen: NAD  CV: RRR with normal S1, S2, no GRM  Resp: CTA Bilaterally  Neck: supple with  mild thyromegaly  Extremities: No TTP, no deformity  Neuro: CN II-XII intact grossly, moves all extremities  Psych: normal affect and mentation.    Recent Results (from the past 744 hour(s))   US OB < 14 Weeks Single    Narrative    US OB < 14 WEEKS SINGLE-TRANSABDOMINAL    Gestational age by LMP: 10 weeks 1 day    History: Pregnancy test positive.    Comparison: None available.    Gestation: Single  Cardiac activity:  Regular  Heart rate:  185 bpm  Adnexa:  A right corpus luteum is suggested.  Other: Trace anterior left subchorionic fluid is questioned.  CRL:  21.0 mm for an estimated gestational age of 8 weeks 6 days.      Impression    Impression:  Single viable intrauterine pregnancy with an estimated  delivery date of 3/11/2013.    BETH JUDD MD         SYSTEM ID:  [id number]         I/P  (Z34.80) Supervision of other normal pregnancy, antepartum  (primary encounter diagnosis)  Comment:   Plan: Treponema Abs w Reflex to RPR and Titer,         Rubella Antibody IgG, ABO/Rh type and screen,         CBC with Platelets & Differential, Hepatitis B         surface antigen, Hepatitis C Screen Reflex to         HCV RNA Quant and Genotype, HIV Antigen         Antibody Combo, Chlamydia trachomatis/Neisseria        gonorrhoeae by PCR, UA with Microscopic reflex         to Culture, Urine Culture, CANCELED: UA with         Microscopic reflex to Culture, CANCELED: Urine         Culture, CANCELED: Chlamydia         trachomatis/Neisseria gonorrhoeae by PCR          Discussed safety, nutrition, screening for cystic fibrosis, spina bifida, spinal muscular atrophy, quad screen, cffDNA screening as appropriate.  F/U scheduled, discussed call schedule rotation.  Return visit in 1 month     Plan MFM scan at 20 weeks  Patient inquired about delivering in [place] for availability of NICU  Had considered home birth prior to her IUFD last year.    Pregnancy risk factors include:    37 week IUFD, GDM on insulin,  polyhydramnios- delivered vaginally  No explanatory findings on autopsy, under developed lungs.  SAB x 2    Mike Noel MD FACOG  3:32 PM 8/8/2022

## 2022-08-09 LAB
HBV SURFACE AG SERPL QL IA: NONREACTIVE
HCV AB SERPL QL IA: NONREACTIVE
HIV 1+2 AB+HIV1 P24 AG SERPL QL IA: NONREACTIVE
T PALLIDUM AB SER QL: NONREACTIVE

## 2022-08-10 LAB
RUBV IGG SERPL QL IA: 0.15 INDEX
RUBV IGG SERPL QL IA: NORMAL

## 2022-08-11 LAB — BACTERIA UR CULT: NO GROWTH

## 2022-08-22 ENCOUNTER — PRENATAL OFFICE VISIT (OUTPATIENT)
Dept: OBGYN | Facility: OTHER | Age: 27
End: 2022-08-22
Attending: OBSTETRICS & GYNECOLOGY
Payer: COMMERCIAL

## 2022-08-22 VITALS
DIASTOLIC BLOOD PRESSURE: 70 MMHG | HEART RATE: 77 BPM | BODY MASS INDEX: 33.25 KG/M2 | WEIGHT: 206 LBS | SYSTOLIC BLOOD PRESSURE: 118 MMHG

## 2022-08-22 DIAGNOSIS — O09.92 HRP (HIGH RISK PREGNANCY), SECOND TRIMESTER: ICD-10-CM

## 2022-08-22 DIAGNOSIS — Z34.80 SUPERVISION OF OTHER NORMAL PREGNANCY, ANTEPARTUM: Primary | ICD-10-CM

## 2022-08-22 PROCEDURE — 99207 PR OB VISIT-NO CHARGE - GICH ONLY: CPT | Performed by: OBSTETRICS & GYNECOLOGY

## 2022-08-22 PROCEDURE — 36415 COLL VENOUS BLD VENIPUNCTURE: CPT | Mod: ZL | Performed by: OBSTETRICS & GYNECOLOGY

## 2022-08-22 NOTE — PROGRESS NOTES
CC: Recheck OB visit at 11w2d    HPI: Miladis Harper presents for a routine OB visit now at 11w2d  Concerns: Nauseous, otherwise OK  Patient notices normal fetal movement, denies contractions, vaginal bleeding or leaking of fluid.    OB History    Para Term  AB Living   4 1 1 0 2 0   SAB IAB Ectopic Multiple Live Births   2 0 0 0 0      # Outcome Date GA Lbr Han/2nd Weight Sex Delivery Anes PTL Lv   4 Current            3 SAB 22     AB, MISSED      2 Term 21 37w0d 03:39 / 02:13 2.721 kg (6 lb)  Vag-Spont Local N FD      Name: WENDY HARPER FD      Apgar1: 0  Apgar5: 0   1 SAB 19 13w0d            Current Outpatient Medications   Medication     Prenatal Vit-Fe Fumarate-FA (PRENATAL MULTIVITAMIN  PLUS IRON) 27-1 MG TABS     No current facility-administered medications for this visit.     O: /70   Pulse 77   Wt 93.4 kg (206 lb)   LMP 2022   BMI 33.25 kg/m    Body mass index is 33.25 kg/m .  See OB flow sheet  EXAM:  NAD  Bedside US shows viable IUP with posterior placenta, normal FH activity and movement, normal AFV    No results found for any visits on 22.    A/P: (Z34.80) Supervision of other normal pregnancy, antepartum  (primary encounter diagnosis)  Comment:   Plan: Non Invasive Prenatal Test Cell Free DNA              Recheck in 4 weeks      Problem List:     Pregnancy risk factors include:    37 week IUFD, GDM on insulin, polyhydramnios- delivered vaginally  No explanatory findings on autopsy, under developed lungs.  SAB x 2    Mike Noel MD FACOG  4:25 PM 2022

## 2022-08-22 NOTE — NURSING NOTE
"Chief Complaint   Patient presents with     Prenatal Care     11 weeks 2 days       Initial /70   Pulse 77   Wt 93.4 kg (206 lb)   LMP 05/26/2022   BMI 33.25 kg/m   Estimated body mass index is 33.25 kg/m  as calculated from the following:    Height as of 7/21/22: 1.676 m (5' 6\").    Weight as of this encounter: 93.4 kg (206 lb).  Medication Reconciliation: complete          Anyi Doan LPN  "

## 2022-08-29 LAB — SCANNED LAB RESULT: NORMAL

## 2022-09-18 ENCOUNTER — HEALTH MAINTENANCE LETTER (OUTPATIENT)
Age: 27
End: 2022-09-18

## 2022-09-19 ENCOUNTER — PRENATAL OFFICE VISIT (OUTPATIENT)
Dept: OBGYN | Facility: OTHER | Age: 27
End: 2022-09-19
Attending: OBSTETRICS & GYNECOLOGY
Payer: COMMERCIAL

## 2022-09-19 VITALS
DIASTOLIC BLOOD PRESSURE: 72 MMHG | WEIGHT: 206 LBS | SYSTOLIC BLOOD PRESSURE: 120 MMHG | BODY MASS INDEX: 33.25 KG/M2 | HEART RATE: 83 BPM

## 2022-09-19 DIAGNOSIS — Z86.32 HISTORY OF GESTATIONAL DIABETES IN PRIOR PREGNANCY, CURRENTLY PREGNANT IN SECOND TRIMESTER: Primary | ICD-10-CM

## 2022-09-19 DIAGNOSIS — R30.0 DYSURIA: ICD-10-CM

## 2022-09-19 DIAGNOSIS — O09.292 HISTORY OF GESTATIONAL DIABETES IN PRIOR PREGNANCY, CURRENTLY PREGNANT IN SECOND TRIMESTER: Primary | ICD-10-CM

## 2022-09-19 LAB
ALBUMIN UR-MCNC: NEGATIVE MG/DL
APPEARANCE UR: CLEAR
BACTERIA #/AREA URNS HPF: ABNORMAL /HPF
BILIRUB UR QL STRIP: NEGATIVE
COLOR UR AUTO: ABNORMAL
GLUCOSE UR STRIP-MCNC: NEGATIVE MG/DL
HGB UR QL STRIP: NEGATIVE
KETONES UR STRIP-MCNC: NEGATIVE MG/DL
LEUKOCYTE ESTERASE UR QL STRIP: ABNORMAL
MUCOUS THREADS #/AREA URNS LPF: PRESENT /LPF
NITRATE UR QL: NEGATIVE
PH UR STRIP: 5.5 [PH] (ref 5–9)
RBC URINE: 1 /HPF
SP GR UR STRIP: 1.02 (ref 1–1.03)
UROBILINOGEN UR STRIP-MCNC: NORMAL MG/DL
WBC URINE: >182 /HPF

## 2022-09-19 PROCEDURE — 81001 URINALYSIS AUTO W/SCOPE: CPT | Mod: ZL | Performed by: OBSTETRICS & GYNECOLOGY

## 2022-09-19 PROCEDURE — 99207 PR OB VISIT-NO CHARGE - GICH ONLY: CPT | Performed by: OBSTETRICS & GYNECOLOGY

## 2022-09-19 PROCEDURE — 87086 URINE CULTURE/COLONY COUNT: CPT | Mod: ZL | Performed by: OBSTETRICS & GYNECOLOGY

## 2022-09-19 RX ORDER — AMOXICILLIN 500 MG/1
500 CAPSULE ORAL 2 TIMES DAILY
Qty: 6 CAPSULE | Refills: 0 | Status: SHIPPED | OUTPATIENT
Start: 2022-09-19 | End: 2022-09-22

## 2022-09-19 ASSESSMENT — PAIN SCALES - GENERAL: PAINLEVEL: NO PAIN (0)

## 2022-09-19 NOTE — PROGRESS NOTES
CC: Recheck OB visit at 15w2d    HPI: Miladis Harper presents for a routine OB visit now at 15w2d  Concerns: Some urinary frequency and double voiding.  Patient denies contractions, vaginal bleeding or leaking of fluid.    OB History    Para Term  AB Living   4 1 1 0 2 0   SAB IAB Ectopic Multiple Live Births   2 0 0 0 0      # Outcome Date GA Lbr Han/2nd Weight Sex Delivery Anes PTL Lv   4 Current            3 SAB 22     AB, MISSED      2 Term 21 37w0d 03:39 / 02:13 2.721 kg (6 lb)  Vag-Spont Local N FD      Name: WENDY HARPER FD      Apgar1: 0  Apgar5: 0   1 SAB 19 13w0d            Current Outpatient Medications   Medication     Prenatal Vit-Fe Fumarate-FA (PRENATAL MULTIVITAMIN  PLUS IRON) 27-1 MG TABS     No current facility-administered medications for this visit.     O: /72   Pulse 83   Wt 93.4 kg (206 lb)   LMP 2022   BMI 33.25 kg/m    Body mass index is 33.25 kg/m .  See OB flow sheet  EXAM:  NAD    FHT:155 bpm  Bedside US shows viable IUP c/w dates with no polyhydramnios or hyrops    No results found for any visits on 22.    A/P: 15w2d    Recheck in 4 weeks  Early GCT this month with History of GDM A2    Problem List:   Pregnancy risk factors include:    37 week IUFD, GDM on insulin, polyhydramnios- delivered vaginally  No explanatory findings on autopsy, under developed lungs.  SAB x 2    Mike Noel MD FACOG  3:04 PM 2022

## 2022-09-19 NOTE — NURSING NOTE
"Chief Complaint   Patient presents with     Prenatal Care     15 weeks 2 days   Patient presents for 15 weeks 2 days and has concerns about anxiety of pregnancy due to hx.     Initial /72   Pulse 83   Wt 93.4 kg (206 lb)   LMP 05/26/2022   BMI 33.25 kg/m   Estimated body mass index is 33.25 kg/m  as calculated from the following:    Height as of 7/21/22: 1.676 m (5' 6\").    Weight as of this encounter: 93.4 kg (206 lb).  Medication Reconciliation: complete        Anyi Doan LPN  "

## 2022-09-21 DIAGNOSIS — N30.00 ACUTE CYSTITIS WITHOUT HEMATURIA: Primary | ICD-10-CM

## 2022-09-21 LAB — BACTERIA UR CULT: ABNORMAL

## 2022-09-21 RX ORDER — NITROFURANTOIN 25; 75 MG/1; MG/1
100 CAPSULE ORAL 2 TIMES DAILY
Qty: 14 CAPSULE | Refills: 0 | Status: SHIPPED | OUTPATIENT
Start: 2022-09-21 | End: 2022-09-28

## 2022-09-30 ENCOUNTER — LAB (OUTPATIENT)
Dept: LAB | Facility: OTHER | Age: 27
End: 2022-09-30
Attending: OBSTETRICS & GYNECOLOGY
Payer: COMMERCIAL

## 2022-09-30 DIAGNOSIS — Z86.32 HISTORY OF GESTATIONAL DIABETES IN PRIOR PREGNANCY, CURRENTLY PREGNANT IN SECOND TRIMESTER: ICD-10-CM

## 2022-09-30 DIAGNOSIS — O09.292 HISTORY OF GESTATIONAL DIABETES IN PRIOR PREGNANCY, CURRENTLY PREGNANT IN SECOND TRIMESTER: ICD-10-CM

## 2022-09-30 LAB — GLUCOSE 1H P 50 G GLC PO SERPL-MCNC: 125 MG/DL (ref 70–129)

## 2022-09-30 PROCEDURE — 82950 GLUCOSE TEST: CPT | Mod: ZL

## 2022-09-30 PROCEDURE — 36415 COLL VENOUS BLD VENIPUNCTURE: CPT | Mod: ZL

## 2022-10-03 ENCOUNTER — PRENATAL OFFICE VISIT (OUTPATIENT)
Dept: OBGYN | Facility: OTHER | Age: 27
End: 2022-10-03
Attending: OBSTETRICS & GYNECOLOGY
Payer: COMMERCIAL

## 2022-10-03 VITALS
HEART RATE: 91 BPM | WEIGHT: 208 LBS | BODY MASS INDEX: 33.57 KG/M2 | SYSTOLIC BLOOD PRESSURE: 120 MMHG | DIASTOLIC BLOOD PRESSURE: 60 MMHG

## 2022-10-03 DIAGNOSIS — F43.23 ADJUSTMENT DISORDER WITH MIXED ANXIETY AND DEPRESSED MOOD: ICD-10-CM

## 2022-10-03 DIAGNOSIS — O23.42 UTI (URINARY TRACT INFECTION) IN PREGNANCY IN SECOND TRIMESTER: Primary | ICD-10-CM

## 2022-10-03 PROCEDURE — 99207 PR OB VISIT-NO CHARGE - GICH ONLY: CPT | Performed by: OBSTETRICS & GYNECOLOGY

## 2022-10-03 PROCEDURE — 87086 URINE CULTURE/COLONY COUNT: CPT | Mod: ZL | Performed by: OBSTETRICS & GYNECOLOGY

## 2022-10-03 RX ORDER — CITALOPRAM HYDROBROMIDE 10 MG/1
10 TABLET ORAL DAILY
Qty: 90 TABLET | Refills: 3 | Status: SHIPPED | OUTPATIENT
Start: 2022-10-03

## 2022-10-03 ASSESSMENT — PAIN SCALES - GENERAL: PAINLEVEL: NO PAIN (0)

## 2022-10-03 NOTE — PROGRESS NOTES
CC: Recheck OB visit at 17w2d    HPI: Miladis Harper presents for a routine OB visit now at 17w2d  Concerns: Struggling with some anxiety and PTSD over her last pregnancy and some residual depression associated with her stillbirth.  Patient notices normal fetal movement, denies contractions, vaginal bleeding or leaking of fluid.    OB History    Para Term  AB Living   4 1 1 0 2 0   SAB IAB Ectopic Multiple Live Births   2 0 0 0 0      # Outcome Date GA Lbr Han/2nd Weight Sex Delivery Anes PTL Lv   4 Current            3 SAB 22     AB, MISSED      2 Term 21 37w0d 03:39 / 02:13 2.721 kg (6 lb)  Vag-Spont Local N FD      Name: WENDY HARPER FD      Apgar1: 0  Apgar5: 0   1 SAB 19 13w0d            Current Outpatient Medications   Medication     citalopram (CELEXA) 10 MG tablet     Prenatal Vit-Fe Fumarate-FA (PRENATAL MULTIVITAMIN  PLUS IRON) 27-1 MG TABS     No current facility-administered medications for this visit.     O: /60   Pulse 91   Wt 94.3 kg (208 lb)   LMP 2022   BMI 33.57 kg/m    Body mass index is 33.57 kg/m .  See OB flow sheet  EXAM:  NAD  Viable IUP with normal AFV on bedside US with normal fetal heart rate and activity today.    No results found for any visits on 10/03/22.    A/P: (O23.42) UTI (urinary tract infection) in pregnancy in second trimester  (primary encounter diagnosis)  Comment:   Plan: Urine Culture          (F43.23) Adjustment disorder with mixed anxiety and depressed mood  Comment:   Plan: citalopram (CELEXA) 10 MG tablet          Recheck in 2-4 weeks    Problem List:   Pregnancy risk factors include:    History of 37 week IUFD, GDM on insulin, polyhydramnios- delivered vaginally  No explanatory findings on autopsy, under developed lungs.  Passed early GCT, recheck at 24-28 weeks  SAB x 2  Asymptomatic bacteruria with E. Coli- treated 2022- recheck culture 10/3/2022  Celexa started 10/3/2022    Mike Noel MD  FACOG  5:05 PM 10/3/2022

## 2022-10-03 NOTE — NURSING NOTE
"Chief Complaint   Patient presents with     Prenatal Care     17 weeks 2 days     Patient presents for prenatal check with no concerns.   Initial /60   Pulse 91   Wt 94.3 kg (208 lb)   LMP 05/26/2022   BMI 33.57 kg/m   Estimated body mass index is 33.57 kg/m  as calculated from the following:    Height as of 7/21/22: 1.676 m (5' 6\").    Weight as of this encounter: 94.3 kg (208 lb).  Medication Reconciliation: complete          Anyi Doan LPN  "

## 2022-10-05 LAB — BACTERIA UR CULT: NORMAL

## 2022-10-25 ENCOUNTER — HOSPITAL ENCOUNTER (OUTPATIENT)
Dept: ULTRASOUND IMAGING | Facility: OTHER | Age: 27
Discharge: HOME | End: 2022-10-25
Attending: OBSTETRICS & GYNECOLOGY
Payer: COMMERCIAL

## 2022-10-25 ENCOUNTER — HOSPITAL ENCOUNTER (OUTPATIENT)
Dept: ULTRASOUND IMAGING | Facility: CLINIC | Age: 27
Discharge: HOME | End: 2022-10-25
Attending: OBSTETRICS & GYNECOLOGY
Payer: COMMERCIAL

## 2022-10-25 ENCOUNTER — OFFICE VISIT (OUTPATIENT)
Dept: MATERNAL FETAL MEDICINE | Facility: CLINIC | Age: 27
End: 2022-10-25
Attending: OBSTETRICS & GYNECOLOGY
Payer: COMMERCIAL

## 2022-10-25 ENCOUNTER — PRENATAL OFFICE VISIT (OUTPATIENT)
Dept: OBGYN | Facility: OTHER | Age: 27
End: 2022-10-25
Attending: OBSTETRICS & GYNECOLOGY
Payer: COMMERCIAL

## 2022-10-25 VITALS
BODY MASS INDEX: 33.73 KG/M2 | SYSTOLIC BLOOD PRESSURE: 120 MMHG | DIASTOLIC BLOOD PRESSURE: 70 MMHG | HEART RATE: 84 BPM | WEIGHT: 209 LBS

## 2022-10-25 DIAGNOSIS — O09.292 HISTORY OF GESTATIONAL DIABETES IN PRIOR PREGNANCY, CURRENTLY PREGNANT IN SECOND TRIMESTER: Primary | ICD-10-CM

## 2022-10-25 DIAGNOSIS — O09.92 HRP (HIGH RISK PREGNANCY), SECOND TRIMESTER: ICD-10-CM

## 2022-10-25 DIAGNOSIS — Z36.3 SCREENING, ANTENATAL, FOR MALFORMATION BY ULTRASOUND: Primary | ICD-10-CM

## 2022-10-25 DIAGNOSIS — Z86.32 HISTORY OF GESTATIONAL DIABETES IN PRIOR PREGNANCY, CURRENTLY PREGNANT IN SECOND TRIMESTER: Primary | ICD-10-CM

## 2022-10-25 PROCEDURE — 76811 OB US DETAILED SNGL FETUS: CPT | Mod: 26 | Performed by: OBSTETRICS & GYNECOLOGY

## 2022-10-25 PROCEDURE — 99207 PR OB VISIT-NO CHARGE - GICH ONLY: CPT | Performed by: OBSTETRICS & GYNECOLOGY

## 2022-10-25 ASSESSMENT — PAIN SCALES - GENERAL: PAINLEVEL: NO PAIN (0)

## 2022-10-25 NOTE — PROGRESS NOTES
"Telemedicine Visit: The patient's condition can be safely assessed and treated via synchronous audio and visual telemedicine encounter.      Reason for Telemedicine Visit: Patient convenience (e.g. access to timely appointments / distance to available provider)    Originating Site (Patient Location): [facility]: [facility]  Distant Location (provider location):  On-site    Consent:  The patient/guardian has verbally consented to: the potential risks and benefits of telemedicine (video visit) versus in person care; bill my insurance or make self-payment for services provided; and responsibility for payment of non-covered services.     Mode of Communication:  Video Conference via [employer]    As the provider I attest to compliance with applicable laws and regulations related to telemedicine.    Please see \"Imaging\" tab under \"Chart Review\" for details of today's visit.    Wendy Mondragon MD PhD  Maternal Fetal Medicine     "

## 2022-10-25 NOTE — PROGRESS NOTES
CC: Recheck OB visit at 20w3d    HPI: Miladis Harper presents for a routine OB visit now at 20w3d  Concerns: No  Patient notices normal fetal movement, denies contractions, vaginal bleeding or leaking of fluid.    OB History    Para Term  AB Living   4 1 1 0 2 0   SAB IAB Ectopic Multiple Live Births   2 0 0 0 0      # Outcome Date GA Lbr Han/2nd Weight Sex Delivery Anes PTL Lv   4 Current            3 SAB 22     AB, MISSED      2 Term 21 37w0d 03:39 / 02:13 2.721 kg (6 lb)  Vag-Spont Local N FD      Name: WENDY HARPER FD      Apgar1: 0  Apgar5: 0   1 SAB 19 13w0d            Current Outpatient Medications   Medication     citalopram (CELEXA) 10 MG tablet     Prenatal Vit-Fe Fumarate-FA (PRENATAL MULTIVITAMIN  PLUS IRON) 27-1 MG TABS     No current facility-administered medications for this visit.       O: /70   Pulse 84   Wt 94.8 kg (209 lb)   LMP 2022   BMI 33.73 kg/m    Body mass index is 33.73 kg/m .  See OB flow sheet  EXAM:  NAD    No results found for any visits on 10/25/22.    A/P: 20w3d gestation    MFM scan reportedly normal pending official report  Recheck in 2-4 weeks    Problem List:   Pregnancy risk factors include:    History of 37 week IUFD, GDM on insulin, polyhydramnios- delivered vaginally  No explanatory findings on autopsy, under developed lungs.  Passed early GCT, recheck at 24-28 weeks  SAB x 2  Asymptomatic bacteruria with E. Coli- treated 2022- recheck culture 10/3/2022  Celexa started 10/3/2022     Mike Noel MD FACOG  9:07 AM 10/25/2022

## 2022-11-10 ENCOUNTER — PRENATAL OFFICE VISIT (OUTPATIENT)
Dept: OBGYN | Facility: OTHER | Age: 27
End: 2022-11-10
Attending: OBSTETRICS & GYNECOLOGY
Payer: COMMERCIAL

## 2022-11-10 VITALS
BODY MASS INDEX: 33.89 KG/M2 | WEIGHT: 210 LBS | HEART RATE: 77 BPM | SYSTOLIC BLOOD PRESSURE: 120 MMHG | DIASTOLIC BLOOD PRESSURE: 70 MMHG

## 2022-11-10 DIAGNOSIS — O09.92 HRP (HIGH RISK PREGNANCY), SECOND TRIMESTER: Primary | ICD-10-CM

## 2022-11-10 PROCEDURE — 99207 PR OB VISIT-NO CHARGE - GICH ONLY: CPT | Performed by: OBSTETRICS & GYNECOLOGY

## 2022-11-10 NOTE — PROGRESS NOTES
CC: Recheck OB visit at 22w5d    HPI: Miladis Harper presents for a routine OB visit now at 22w5d  Concerns: None  Patient notices normal fetal movement, denies contractions, vaginal bleeding or leaking of fluid.    OB History    Para Term  AB Living   4 1 1 0 2 0   SAB IAB Ectopic Multiple Live Births   2 0 0 0 0      # Outcome Date GA Lbr Han/2nd Weight Sex Delivery Anes PTL Lv   4 Current            3 SAB 22     AB, MISSED      2 Term 21 37w0d 03:39 / 02:13 2.721 kg (6 lb)  Vag-Spont Local N FD      Name: WENDY HARPER FD      Apgar1: 0  Apgar5: 0   1 SAB 19 13w0d            Current Outpatient Medications   Medication     citalopram (CELEXA) 10 MG tablet     Prenatal Vit-Fe Fumarate-FA (PRENATAL MULTIVITAMIN  PLUS IRON) 27-1 MG TABS     No current facility-administered medications for this visit.       O: /70   Pulse 77   Wt 95.3 kg (210 lb)   LMP 2022   BMI 33.89 kg/m    Body mass index is 33.89 kg/m .  See OB flow sheet  EXAM:  NAD  Bedside US shows viable IUP  's    No results found for any visits on 11/10/22.    A/P: 22w5d gestation    Recheck in 2-4 weeks    Problem List:   Pregnancy risk factors include:    History of 37 week IUFD, GDM on insulin, polyhydramnios- delivered vaginally  No explanatory findings on autopsy, under developed lungs.  Passed early GCT, recheck at 24-28 weeks  SAB x 2  Asymptomatic bacteruria with E. Coli- treated 2022- recheck culture 10/3/2022  Celexa started 10/3/2022    Mike Noel MD FACOG  3:14 PM 11/10/2022    Statement Selected

## 2022-11-10 NOTE — NURSING NOTE
"Chief Complaint   Patient presents with     Prenatal Care     22 week 5 days     Pt presents to clinic today for prenatal care 22 week 5 day. Pt denies any bleeding, or leakage of fluid at this time. States baby is moving good. Patient c/o braxtin king contractions.   Initial /70   Pulse 77   Wt 95.3 kg (210 lb)   LMP 05/26/2022   BMI 33.89 kg/m   Estimated body mass index is 33.89 kg/m  as calculated from the following:    Height as of 7/21/22: 1.676 m (5' 6\").    Weight as of this encounter: 95.3 kg (210 lb).  Medication Reconciliation: complete          Anyi Doan LPN  "

## 2022-12-08 ENCOUNTER — PRENATAL OFFICE VISIT (OUTPATIENT)
Dept: OBGYN | Facility: OTHER | Age: 27
End: 2022-12-08
Attending: OBSTETRICS & GYNECOLOGY
Payer: COMMERCIAL

## 2022-12-08 VITALS
HEART RATE: 104 BPM | WEIGHT: 212 LBS | DIASTOLIC BLOOD PRESSURE: 72 MMHG | BODY MASS INDEX: 34.22 KG/M2 | SYSTOLIC BLOOD PRESSURE: 122 MMHG

## 2022-12-08 DIAGNOSIS — O09.92 HRP (HIGH RISK PREGNANCY), SECOND TRIMESTER: ICD-10-CM

## 2022-12-08 DIAGNOSIS — Z86.32 HISTORY OF GESTATIONAL DIABETES IN PRIOR PREGNANCY, CURRENTLY PREGNANT IN SECOND TRIMESTER: Primary | ICD-10-CM

## 2022-12-08 DIAGNOSIS — O09.292 HISTORY OF GESTATIONAL DIABETES IN PRIOR PREGNANCY, CURRENTLY PREGNANT IN SECOND TRIMESTER: Primary | ICD-10-CM

## 2022-12-08 DIAGNOSIS — O24.410 GDM, CLASS A1: ICD-10-CM

## 2022-12-08 LAB
ERYTHROCYTE [DISTWIDTH] IN BLOOD BY AUTOMATED COUNT: 14.6 % (ref 10–15)
GLUCOSE 1H P 50 G GLC PO SERPL-MCNC: 193 MG/DL (ref 70–129)
HCT VFR BLD AUTO: 37.4 % (ref 35–47)
HGB BLD-MCNC: 12.2 G/DL (ref 11.7–15.7)
MCH RBC QN AUTO: 26.7 PG (ref 26.5–33)
MCHC RBC AUTO-ENTMCNC: 32.6 G/DL (ref 31.5–36.5)
MCV RBC AUTO: 82 FL (ref 78–100)
PLATELET # BLD AUTO: 142 10E3/UL (ref 150–450)
RBC # BLD AUTO: 4.57 10E6/UL (ref 3.8–5.2)
WBC # BLD AUTO: 7.6 10E3/UL (ref 4–11)

## 2022-12-08 PROCEDURE — 86780 TREPONEMA PALLIDUM: CPT | Mod: ZL | Performed by: OBSTETRICS & GYNECOLOGY

## 2022-12-08 PROCEDURE — 99207 PR OB VISIT-NO CHARGE - GICH ONLY: CPT | Performed by: OBSTETRICS & GYNECOLOGY

## 2022-12-08 PROCEDURE — 85027 COMPLETE CBC AUTOMATED: CPT | Mod: ZL | Performed by: OBSTETRICS & GYNECOLOGY

## 2022-12-08 PROCEDURE — 36415 COLL VENOUS BLD VENIPUNCTURE: CPT | Mod: ZL | Performed by: OBSTETRICS & GYNECOLOGY

## 2022-12-08 PROCEDURE — 82950 GLUCOSE TEST: CPT | Mod: ZL | Performed by: OBSTETRICS & GYNECOLOGY

## 2022-12-08 RX ORDER — GLUCOSAMINE HCL/CHONDROITIN SU 500-400 MG
CAPSULE ORAL
Qty: 400 EACH | Refills: 3 | Status: SHIPPED | OUTPATIENT
Start: 2022-12-08

## 2022-12-08 RX ORDER — LANCETS
EACH MISCELLANEOUS
Qty: 400 EACH | Refills: 3 | Status: SHIPPED | OUTPATIENT
Start: 2022-12-08

## 2022-12-08 NOTE — NURSING NOTE
"Chief Complaint   Patient presents with     Prenatal Care     26 week 5 days   Pt presents to clinic today for prenatal care 26 week 2 days. Pt denies any bleeding, or leakage of fluid at this time. States baby is moving good. Patient denies contractions.     Initial /72   Pulse 104   Wt 96.2 kg (212 lb)   LMP 05/26/2022   BMI 34.22 kg/m   Estimated body mass index is 34.22 kg/m  as calculated from the following:    Height as of 7/21/22: 1.676 m (5' 6\").    Weight as of this encounter: 96.2 kg (212 lb).  Medication Reconciliation: complete        Anyi Doan, TIFFANIE  "

## 2022-12-08 NOTE — PROGRESS NOTES
CC: Recheck OB visit at 26w5d    HPI: Miladis Harper presents for a routine OB visit now at 26w5d  Concerns: some pelvic pain noted but she had with last pregnancy, has a zoe on her breast that she would like looked at and a small areas in her left upper quadrant in her abdomen where she feels a lump/bump.   Patient notices normal fetal movement, denies contractions, vaginal bleeding or leaking of fluid.    OB History    Para Term  AB Living   4 1 1 0 2 0   SAB IAB Ectopic Multiple Live Births   2 0 0 0 0      # Outcome Date GA Lbr Han/2nd Weight Sex Delivery Anes PTL Lv   4 Current            3 SAB 22     AB, MISSED      2 Term 21 37w0d 03:39 / 02:13 2.721 kg (6 lb)  Vag-Spont Local N FD      Name: WENDY HARPER FD      Apgar1: 0  Apgar5: 0   1 SAB 19 13w0d            Current Outpatient Medications   Medication     citalopram (CELEXA) 10 MG tablet     Prenatal Vit-Fe Fumarate-FA (PRENATAL MULTIVITAMIN  PLUS IRON) 27-1 MG TABS     No current facility-administered medications for this visit.         O: /72   Pulse 104   Wt 96.2 kg (212 lb)   LMP 2022   BMI 34.22 kg/m    Body mass index is 34.22 kg/m .  See OB flow sheet  EXAM:  NAD  Right Breast:11X9 mm small protruding skin growth observed middle underside of breast. Dark fleshy color on outer edges but gets dark brown/black within center of growth. Irregular borders. soft to palpation. No pain when palpated.  Abdomen: Midline Left upper quadrant small quarter sized area which feels to be soft, smooth, and fluctuant. Not present when patient is lying down. Only palpated when in half sit or fully sitting up. Diastasis recti noted throughout abdomen.     FH:26 cm  FHT:140 bpm    No results found for any visits on 22.    A/P: .26w5d, discussed [facility] tour around 32-36 weeks. She would like a  and two support people ( and mom) with her in labor.     Small lump on abdomen likely protrusion of  omentum or bowel loop through diastasis area. Does not appear to be a full herniation. Patient will notify with any worsening or changing of symptoms.     Recheck in 2-4 weeks  Planning tdap later after she discusses with her spouse.  Will measure left breast skin growth in one month to ensure size is not increasing. Discussed that best removal time would be after pregnancy and breast feeding. She is in agreement with this plan.     Results for orders placed or performed in visit on 22   Glucose tolerance, gest screen, 1 hour     Status: Abnormal   Result Value Ref Range    Glu Gest Screen 1hr 50g 193 (H) 70 - 129 mg/dL    Narrative    This is a screening test for Gestational Diabetes Mellitus.   If results are 130 mg/dL or greater, a Standard 100 gram Gestational  3 hour Glucose Tolerance should be performed.   CBC W PLT No Diff     Status: Abnormal   Result Value Ref Range    WBC Count 7.6 4.0 - 11.0 10e3/uL    RBC Count 4.57 3.80 - 5.20 10e6/uL    Hemoglobin 12.2 11.7 - 15.7 g/dL    Hematocrit 37.4 35.0 - 47.0 %    MCV 82 78 - 100 fL    MCH 26.7 26.5 - 33.0 pg    MCHC 32.6 31.5 - 36.5 g/dL    RDW 14.6 10.0 - 15.0 %    Platelet Count 142 (L) 150 - 450 10e3/uL         Problem List:  Pregnancy risk factors include:    History of 37 week IUFD, GDM on insulin, polyhydramnios- delivered vaginally  No explanatory findings on autopsy, under developed lungs.  Passed early GCT, recheck at 24-28 weeks- done today   SAB x 2  Asymptomatic bacteruria with E. Coli- treated 2022- recheck culture 10/3/2022 Mixed urogenital mireya   Celexa started 10/3/2022    VINI Tran CNP        Mike Noel MD FACOG  11:12 AM 2022

## 2022-12-09 ENCOUNTER — TELEPHONE (OUTPATIENT)
Dept: OBGYN | Facility: OTHER | Age: 27
End: 2022-12-09

## 2022-12-09 DIAGNOSIS — Z86.32 HISTORY OF GESTATIONAL DIABETES IN PRIOR PREGNANCY, CURRENTLY PREGNANT IN SECOND TRIMESTER: Primary | ICD-10-CM

## 2022-12-09 DIAGNOSIS — O09.292 HISTORY OF GESTATIONAL DIABETES IN PRIOR PREGNANCY, CURRENTLY PREGNANT IN SECOND TRIMESTER: Primary | ICD-10-CM

## 2022-12-09 LAB — T PALLIDUM AB SER QL: NONREACTIVE

## 2022-12-09 NOTE — TELEPHONE ENCOUNTER
Patient called in after verification of name and , patient states she has changed her mind and would like to do the tree hour glucose testing instead of testing for two weeks. Orders placed.   Juanita Whitmore RN on 2022 at 3:06 PM

## 2022-12-09 NOTE — TELEPHONE ENCOUNTER
Patient would like to do the 3 hour glucose test.    Please place orders  Katja Chaudhary on 12/9/2022 at 2:27 PM

## 2022-12-23 ENCOUNTER — LAB (OUTPATIENT)
Dept: LAB | Facility: OTHER | Age: 27
End: 2022-12-23
Attending: OBSTETRICS & GYNECOLOGY
Payer: COMMERCIAL

## 2022-12-23 DIAGNOSIS — Z86.32 HISTORY OF GESTATIONAL DIABETES IN PRIOR PREGNANCY, CURRENTLY PREGNANT IN SECOND TRIMESTER: ICD-10-CM

## 2022-12-23 DIAGNOSIS — O24.419 GESTATIONAL DIABETES MELLITUS (GDM) IN THIRD TRIMESTER, GESTATIONAL DIABETES METHOD OF CONTROL UNSPECIFIED: Primary | ICD-10-CM

## 2022-12-23 DIAGNOSIS — O09.292 HISTORY OF GESTATIONAL DIABETES IN PRIOR PREGNANCY, CURRENTLY PREGNANT IN SECOND TRIMESTER: ICD-10-CM

## 2022-12-23 LAB
GESTATIONAL GTT 1 HR POST DOSE: 247 MG/DL (ref 60–179)
GESTATIONAL GTT 2 HR POST DOSE: 248 MG/DL (ref 60–154)
GESTATIONAL GTT 3 HR POST DOSE: 172 MG/DL (ref 60–139)
GLUCOSE P FAST SERPL-MCNC: 141 MG/DL (ref 60–94)

## 2022-12-23 PROCEDURE — 82951 GLUCOSE TOLERANCE TEST (GTT): CPT | Mod: ZL

## 2022-12-23 PROCEDURE — 36415 COLL VENOUS BLD VENIPUNCTURE: CPT | Mod: ZL

## 2022-12-23 PROCEDURE — 82950 GLUCOSE TEST: CPT | Mod: ZL

## 2022-12-23 PROCEDURE — 82947 ASSAY GLUCOSE BLOOD QUANT: CPT | Mod: ZL

## 2022-12-23 RX ORDER — GLUCOSAMINE HCL/CHONDROITIN SU 500-400 MG
CAPSULE ORAL
Qty: 400 EACH | Refills: 3 | Status: SHIPPED | OUTPATIENT
Start: 2022-12-23

## 2022-12-23 RX ORDER — LANCETS
EACH MISCELLANEOUS
Qty: 400 EACH | Refills: 3 | Status: SHIPPED | OUTPATIENT
Start: 2022-12-23

## 2022-12-30 ENCOUNTER — PRENATAL OFFICE VISIT (OUTPATIENT)
Dept: OBGYN | Facility: OTHER | Age: 27
End: 2022-12-30
Attending: OBSTETRICS & GYNECOLOGY
Payer: COMMERCIAL

## 2022-12-30 VITALS
WEIGHT: 213.9 LBS | HEART RATE: 100 BPM | DIASTOLIC BLOOD PRESSURE: 72 MMHG | BODY MASS INDEX: 34.52 KG/M2 | SYSTOLIC BLOOD PRESSURE: 122 MMHG

## 2022-12-30 DIAGNOSIS — O09.92 HRP (HIGH RISK PREGNANCY), SECOND TRIMESTER: Primary | ICD-10-CM

## 2022-12-30 DIAGNOSIS — O24.419 GDM, CLASS A2: ICD-10-CM

## 2022-12-30 PROCEDURE — 99207 PR OB VISIT-NO CHARGE - GICH ONLY: CPT | Performed by: OBSTETRICS & GYNECOLOGY

## 2022-12-30 NOTE — PROGRESS NOTES
CC: Recheck OB visit at 29w6d    HPI: Miladis Harper presents for a routine OB visit now at 29w6d  Concerns: elevated fastings 120's, after meals 170's after meals  Patient notices normal fetal movement, denies contractions, vaginal bleeding or leaking of fluid.    OB History    Para Term  AB Living   4 1 1 0 2 0   SAB IAB Ectopic Multiple Live Births   2 0 0 0 0      # Outcome Date GA Lbr Han/2nd Weight Sex Delivery Anes PTL Lv   4 Current            3 SAB 22     AB, MISSED      2 Term 21 37w0d 03:39 / 02:13 2.721 kg (6 lb)  Vag-Spont Local N FD      Name: WENDY HARPER FD      Apgar1: 0  Apgar5: 0   1 SAB 19 13w0d            Current Outpatient Medications   Medication     alcohol swab prep pads     alcohol swab prep pads     blood glucose (NO BRAND SPECIFIED) test strip     blood glucose (NO BRAND SPECIFIED) test strip     blood glucose calibration (NO BRAND SPECIFIED) solution     blood glucose calibration (NO BRAND SPECIFIED) solution     blood glucose monitoring (NO BRAND SPECIFIED) meter device kit     blood glucose monitoring (NO BRAND SPECIFIED) meter device kit     citalopram (CELEXA) 10 MG tablet     Prenatal Vit-Fe Fumarate-FA (PRENATAL MULTIVITAMIN  PLUS IRON) 27-1 MG TABS     thin (NO BRAND SPECIFIED) lancets     thin (NO BRAND SPECIFIED) lancets     No current facility-administered medications for this visit.     O: /72   Pulse 100   Wt 97 kg (213 lb 14.4 oz)   LMP 2022   BMI 34.52 kg/m    Body mass index is 34.52 kg/m .     See OB flow sheet  EXAM:  NAD  FH:33 cm  FHT:130 bpm    No results found for any visits on 22.    A/P: 29w6d weeks    Recheck in 1-2 weeks  Starting Lantus today at 8 Units  Start BPP's in 2 weeks  Growth scan as well    Problem List:  Pregnancy risk factors include:    History of 37 week IUFD, GDM on insulin, polyhydramnios- delivered vaginally  No explanatory findings on autopsy, under developed  lungs.  Passed early GCT, recheck at 24-28 weeks- done today   SAB x 2  Asymptomatic bacteruria with E. Coli- treated 9/19/2022- recheck culture 10/3/2022 Mixed urogenital mireya   Celexa started 10/3/2022  GDM A2 on Toni Noel MD FACOG  3:21 PM 12/30/2022

## 2022-12-30 NOTE — NURSING NOTE
"Chief Complaint   Patient presents with     Prenatal Care     29 week 6 days     Pt presents to clinic today for prenatal care 29 week 6 days. Pt denies any bleeding, or leakage of fluid at this time. States baby is moving good. Patient denies contractions. Patient declined TDAP.   Initial /72   Pulse 100   Wt 97 kg (213 lb 14.4 oz)   LMP 05/26/2022   BMI 34.52 kg/m   Estimated body mass index is 34.52 kg/m  as calculated from the following:    Height as of 7/21/22: 1.676 m (5' 6\").    Weight as of this encounter: 97 kg (213 lb 14.4 oz).  Medication Reconciliation: complete          Anyi Doan LPN  "

## 2023-01-12 ENCOUNTER — PRENATAL OFFICE VISIT (OUTPATIENT)
Dept: OBGYN | Facility: OTHER | Age: 28
End: 2023-01-12
Attending: OBSTETRICS & GYNECOLOGY
Payer: COMMERCIAL

## 2023-01-12 ENCOUNTER — HOSPITAL ENCOUNTER (OUTPATIENT)
Dept: ULTRASOUND IMAGING | Facility: OTHER | Age: 28
Discharge: HOME | End: 2023-01-12
Attending: OBSTETRICS & GYNECOLOGY
Payer: COMMERCIAL

## 2023-01-12 VITALS
BODY MASS INDEX: 34.06 KG/M2 | SYSTOLIC BLOOD PRESSURE: 118 MMHG | HEART RATE: 93 BPM | DIASTOLIC BLOOD PRESSURE: 68 MMHG | WEIGHT: 211 LBS

## 2023-01-12 DIAGNOSIS — O24.419 GDM, CLASS A2: Primary | ICD-10-CM

## 2023-01-12 DIAGNOSIS — O24.419 GDM, CLASS A2: ICD-10-CM

## 2023-01-12 PROCEDURE — 99207 PR OB VISIT-NO CHARGE - GICH ONLY: CPT | Performed by: OBSTETRICS & GYNECOLOGY

## 2023-01-12 PROCEDURE — 59025 FETAL NON-STRESS TEST: CPT | Performed by: OBSTETRICS & GYNECOLOGY

## 2023-01-12 PROCEDURE — 76819 FETAL BIOPHYS PROFIL W/O NST: CPT

## 2023-01-12 PROCEDURE — 76816 OB US FOLLOW-UP PER FETUS: CPT

## 2023-01-12 ASSESSMENT — PAIN SCALES - GENERAL: PAINLEVEL: NO PAIN (0)

## 2023-01-12 NOTE — NURSING NOTE
"Chief Complaint   Patient presents with     Prenatal Care     31 week 5 days   Pt presents to clinic today for prenatal care 31 week 5 days. Pt denies any bleeding, or leakage of fluid at this time. States baby is moving good. Patient denies contractions.     Initial /68   Pulse 93   Wt 95.7 kg (211 lb)   LMP 05/26/2022   BMI 34.06 kg/m   Estimated body mass index is 34.06 kg/m  as calculated from the following:    Height as of 7/21/22: 1.676 m (5' 6\").    Weight as of this encounter: 95.7 kg (211 lb).  Medication Reconciliation: complete        Anyi Doan, TIFFANIE  "

## 2023-01-12 NOTE — PROGRESS NOTES
CC: Recheck OB visit at 31w5d    HPI: Miladis Harper presents for a routine OB visit now at 31w5d  Concerns: Lantus at 14 Units,   Patient notices normal fetal movement, denies contractions, vaginal bleeding or leaking of fluid.    OB History    Para Term  AB Living   4 1 1 0 2 0   SAB IAB Ectopic Multiple Live Births   2 0 0 0 0      # Outcome Date GA Lbr Han/2nd Weight Sex Delivery Anes PTL Lv   4 Current            3 SAB 22     AB, MISSED      2 Term 21 37w0d 03:39 / 02:13 2.721 kg (6 lb)  Vag-Spont Local N FD      Name: WENDY HARPER FD      Apgar1: 0  Apgar5: 0   1 SAB 19 13w0d            Current Outpatient Medications   Medication     alcohol swab prep pads     alcohol swab prep pads     blood glucose (NO BRAND SPECIFIED) test strip     blood glucose (NO BRAND SPECIFIED) test strip     blood glucose calibration (NO BRAND SPECIFIED) solution     blood glucose calibration (NO BRAND SPECIFIED) solution     blood glucose monitoring (NO BRAND SPECIFIED) meter device kit     blood glucose monitoring (NO BRAND SPECIFIED) meter device kit     citalopram (CELEXA) 10 MG tablet     insulin glargine (LANTUS PEN) 100 UNIT/ML pen     insulin pen needle (32G X 4 MM) 32G X 4 MM miscellaneous     Prenatal Vit-Fe Fumarate-FA (PRENATAL MULTIVITAMIN  PLUS IRON) 27-1 MG TABS     thin (NO BRAND SPECIFIED) lancets     thin (NO BRAND SPECIFIED) lancets     No current facility-administered medications for this visit.       O: /68   Pulse 93   Wt 95.7 kg (211 lb)   LMP 2022   BMI 34.06 kg/m    Body mass index is 34.06 kg/m .  See OB flow sheet  EXAM:  NAD    NST doucmentation:    Indication: GDM A2  Duration: 30 min     31w5d  FHR baseline: 130 with moderate variability  Accelerations: y  Decelerations: n  Contractions: n    Category 1  No results found for any visits on 23.    A/P: 31w5d gestation    Recheck in 2 weeks     Problem List:  Pregnancy risk factors  include:    History of 37 week IUFD, GDM on insulin, polyhydramnios- delivered vaginally  No explanatory findings on autopsy, under developed lungs.  Passed early GCT, recheck at 24-28 weeks- done today   SAB x 2  Asymptomatic bacteruria with E. Coli- treated 2022- recheck culture 10/3/2022 Mixed urogenital mireya   Celexa started 10/3/2022  GDM A2 on Lantus at 14 Units     Mike Noel MD FACOG  2:48 PM 2023

## 2023-01-19 ENCOUNTER — ALLIED HEALTH/NURSE VISIT (OUTPATIENT)
Dept: OBGYN | Facility: OTHER | Age: 28
End: 2023-01-19
Attending: OBSTETRICS & GYNECOLOGY
Payer: COMMERCIAL

## 2023-01-19 ENCOUNTER — HOSPITAL ENCOUNTER (OUTPATIENT)
Dept: ULTRASOUND IMAGING | Facility: OTHER | Age: 28
Discharge: HOME | End: 2023-01-19
Attending: OBSTETRICS & GYNECOLOGY
Payer: COMMERCIAL

## 2023-01-19 DIAGNOSIS — O09.92 HRP (HIGH RISK PREGNANCY), SECOND TRIMESTER: ICD-10-CM

## 2023-01-19 DIAGNOSIS — O24.419 GDM, CLASS A2: Primary | ICD-10-CM

## 2023-01-19 PROCEDURE — 99207 PR OB VISIT-NO CHARGE - GICH ONLY: CPT

## 2023-01-19 PROCEDURE — 76819 FETAL BIOPHYS PROFIL W/O NST: CPT

## 2023-01-19 NOTE — PROGRESS NOTES
CC: Recheck OB visit at 32w5d    HPI: Miladis Harper presents for a routine OB visit now at 32w5d with NST.  Concerns: Lantus 17 units, she has been having a manic episode since  but is starting to feel this is getting better as she is noticing that she has slept really well the past few days and hasn't had such an energetic feeling. She is wondering if the insomnia is causing some elevated blood sugars for her.    Patient notices normal fetal movement, denies contractions, vaginal bleeding or leaking of fluid.    OB History    Para Term  AB Living   4 1 1 0 2 0   SAB IAB Ectopic Multiple Live Births   2 0 0 0 0      # Outcome Date GA Lbr Han/2nd Weight Sex Delivery Anes PTL Lv   4 Current            3 SAB 22     AB, MISSED      2 Term 21 37w0d 03:39 / 02:13 2.721 kg (6 lb)  Vag-Spont Local N FD      Name: WENDY HARPER FD      Apgar1: 0  Apgar5: 0   1 SAB 19 13w0d            Current Outpatient Medications   Medication     alcohol swab prep pads     alcohol swab prep pads     blood glucose (NO BRAND SPECIFIED) test strip     blood glucose (NO BRAND SPECIFIED) test strip     blood glucose calibration (NO BRAND SPECIFIED) solution     blood glucose calibration (NO BRAND SPECIFIED) solution     blood glucose monitoring (NO BRAND SPECIFIED) meter device kit     blood glucose monitoring (NO BRAND SPECIFIED) meter device kit     citalopram (CELEXA) 10 MG tablet     insulin glargine (LANTUS PEN) 100 UNIT/ML pen     insulin pen needle (32G X 4 MM) 32G X 4 MM miscellaneous     Prenatal Vit-Fe Fumarate-FA (PRENATAL MULTIVITAMIN  PLUS IRON) 27-1 MG TABS     thin (NO BRAND SPECIFIED) lancets     thin (NO BRAND SPECIFIED) lancets     No current facility-administered medications for this visit.         O: LMP 2022   There is no height or weight on file to calculate BMI.  See OB flow sheet  EXAM:  NAD  NST: category I reactive Baseline 125    Results for orders placed or  performed during the hospital encounter of 01/19/23   US Fetal Biophys Prof w/o Non Stress Test     Status: None    Narrative    US OB FETAL BIOPHY PROFILE W/O NST SINGLE W/LTD, 1/19/2023 2:34 PM    History: Female, age 27 years; HRP (high risk pregnancy), second  trimester    Comparison: 1/12/2023    Fetal Movement:  Score 2: At least 3 discrete body/limb movements in 30 minutes  Score 0: Up to 2 episodes of limb/body movements in 30 minutes                    FM = 2    Fetal Breathing movements:  Score 2: At least one episode, at least 30 seconds duration in 30  minutes of observation.  Score 0: Absent or no episodes of greater than 30 seconds    duration in 30 minutes observation.                    FBM = 2    Fetal Tone:  Score 2: At least one episode of active extension with return to     flexion of fetal limbs or trunk, opening and closing of     hands considered normal tone.  Score 0: Absent or no episodes in 30 minutes of observation.                    FT = 2    Amniotic Fluid Volume:  Score 2: At least one pocket of amniotic fluid measuring at least    2 cm in two perpendicular planes.  Score 0: Either no amniotic fluid or a pocket less than 1 cm in    two perpendicular planes.                    AF = 2                        TOTAL = 8/8    BRAD: 13.12 cm    HRT Rate: 140 bpm    Placenta Location: Fundal, grade 1    Fetal position: Cephalic      Impression    Impression:   Single living intrauterine pregnancy with a biophysical profile score  of 8/8.    MAINE HENRY MD         SYSTEM ID:  [id number]       A/P:   32w5d  Presented for NST today and converted to office visit to review concerns and provide further insulin management. Increased insulin to 20 units today due to elevated glucose fastings from 105-115 in the morning. Discussed high protein snack at bedtime.  She also notes she has been feeling manic since 12/29/22 with a history of bipolar disorder. She is on Celexa 10mg for the depression  management of this disorder. She has not taken this medication in about a week due to her running out. Discussed that SSRIs can worsen the tonja that she is experiencing and discussed that she should not restart the medication. If she needs in the future she should be restarted on wellbutrin with or without trazodone to help with mood stabilization. She is in agreement with this plan.     Recheck in 1 week with BPP/NST     Problem List:  Pregnancy risk factors include:    History of 37 week IUFD, GDM on insulin, polyhydramnios- delivered vaginally- Will plan for 38 week induction.   No explanatory findings on autopsy, under developed lungs.  Passed early GCT, recheck at 24-28 shows she did not pass screen.   SAB x 2  Asymptomatic bacteruria with E. Coli- treated 2022- recheck culture 10/3/2022 Mixed urogenital mireya   Celexa started 10/3/2022- stopped 23 due to tonja symptoms   GDM A2 on Lantus at 20 Units as of 23    I personally reviewed this plan with Dr. Mike Noel MD, FACOG who was in agreement.     Johana Vidal, VINI CNP  3:06 PM 2023

## 2023-01-19 NOTE — NURSING NOTE
"Chief Complaint   Patient presents with     Prenatal Care     NST     Patient passed BPP prior, FHR WNL and reactive upon initial NST start.   Initial LMP 05/26/2022  Estimated body mass index is 34.06 kg/m  as calculated from the following:    Height as of 7/21/22: 1.676 m (5' 6\").    Weight as of 1/12/23: 95.7 kg (211 lb).  Medication Reconciliation: unable or not appropriate to perform    Allyson Medina RN    "

## 2023-01-26 ENCOUNTER — HOSPITAL ENCOUNTER (OUTPATIENT)
Dept: ULTRASOUND IMAGING | Facility: OTHER | Age: 28
Discharge: HOME | End: 2023-01-26
Attending: OBSTETRICS & GYNECOLOGY
Payer: COMMERCIAL

## 2023-01-26 ENCOUNTER — PRENATAL OFFICE VISIT (OUTPATIENT)
Dept: OBGYN | Facility: OTHER | Age: 28
End: 2023-01-26
Attending: OBSTETRICS & GYNECOLOGY
Payer: COMMERCIAL

## 2023-01-26 VITALS
WEIGHT: 213 LBS | HEART RATE: 91 BPM | BODY MASS INDEX: 34.38 KG/M2 | SYSTOLIC BLOOD PRESSURE: 122 MMHG | DIASTOLIC BLOOD PRESSURE: 74 MMHG

## 2023-01-26 DIAGNOSIS — O09.92 HRP (HIGH RISK PREGNANCY), SECOND TRIMESTER: ICD-10-CM

## 2023-01-26 DIAGNOSIS — O09.92 HRP (HIGH RISK PREGNANCY), SECOND TRIMESTER: Primary | ICD-10-CM

## 2023-01-26 DIAGNOSIS — O24.419 GDM, CLASS A2: ICD-10-CM

## 2023-01-26 PROCEDURE — 99207 PR OB VISIT-NO CHARGE - GICH ONLY: CPT | Performed by: OBSTETRICS & GYNECOLOGY

## 2023-01-26 PROCEDURE — 76819 FETAL BIOPHYS PROFIL W/O NST: CPT

## 2023-01-26 PROCEDURE — 59025 FETAL NON-STRESS TEST: CPT | Performed by: OBSTETRICS & GYNECOLOGY

## 2023-01-26 ASSESSMENT — PAIN SCALES - GENERAL: PAINLEVEL: NO PAIN (0)

## 2023-01-26 NOTE — PROGRESS NOTES
CC: Recheck OB visit at 33w5d    HPI: Miladis Harper presents for a routine OB visit now at 33w5d  Concerns: Planning to deliver in [place]  Patient notices normal fetal movement, denies contractions, vaginal bleeding or leaking of fluid.  Normal fasting sugars now on 14 Units Lantus    OB History    Para Term  AB Living   4 1 1 0 2 0   SAB IAB Ectopic Multiple Live Births   2 0 0 0 0      # Outcome Date GA Lbr Han/2nd Weight Sex Delivery Anes PTL Lv   4 Current            3 SAB 22     AB, MISSED      2 Term 21 37w0d 03:39 / 02:13 2.721 kg (6 lb)  Vag-Spont Local N FD      Name: WENDY HARPER FD      Apgar1: 0  Apgar5: 0   1 SAB 19 13w0d            Current Outpatient Medications   Medication     alcohol swab prep pads     alcohol swab prep pads     blood glucose (NO BRAND SPECIFIED) test strip     blood glucose (NO BRAND SPECIFIED) test strip     blood glucose calibration (NO BRAND SPECIFIED) solution     blood glucose calibration (NO BRAND SPECIFIED) solution     blood glucose monitoring (NO BRAND SPECIFIED) meter device kit     blood glucose monitoring (NO BRAND SPECIFIED) meter device kit     citalopram (CELEXA) 10 MG tablet     insulin glargine (LANTUS PEN) 100 UNIT/ML pen     insulin pen needle (32G X 4 MM) 32G X 4 MM miscellaneous     Prenatal Vit-Fe Fumarate-FA (PRENATAL MULTIVITAMIN  PLUS IRON) 27-1 MG TABS     thin (NO BRAND SPECIFIED) lancets     thin (NO BRAND SPECIFIED) lancets     No current facility-administered medications for this visit.     O: /74   Pulse 91   Wt 96.6 kg (213 lb)   LMP 2022   BMI 34.38 kg/m    Body mass index is 34.38 kg/m .  See OB flow sheet  EXAM:  NAD    NST doucmentation:    Indication: (O09.92) HRP (high risk pregnancy), second trimester  (primary encounter diagnosis)  Comment:   Plan: Ob/Gyn Referral          (O24.419) GDM, class A2  Comment:   Plan:     Duration: 30 min     33w5d  FHR baseline: 140 with moderate  variability  Accelerations: y  Decelerations: n  Contractions: n    Category 1    BPP   No results found for any visits on 23.    A/P: (O09.92) HRP (high risk pregnancy), second trimester  (primary encounter diagnosis)  Comment:   Plan: Ob/Gyn Referral        Patient wishing to deliver in [place] for proximity to NICU with HRP on insulin    (O24.419) GDM, class A2  Comment:   Plan: Continue on Lantus at 14 Units    Recheck weekly    Problem List:   Pregnancy risk factors include:    History of 37 week IUFD, GDM on insulin, polyhydramnios- delivered vaginally  No explanatory findings on autopsy, under developed lungs.  Passed early GCT, recheck at 24-28 weeks- done today   SAB x 2  Asymptomatic bacteruria with E. Coli- treated 2022- recheck culture 10/3/2022 Mixed urogenital mireya   Celexa started 10/3/2022  GDM A2 on Lantus at 14 Units    Mike Noel MD FACOG  3:05 PM 2023

## 2023-01-26 NOTE — NURSING NOTE
"Chief Complaint   Patient presents with     Prenatal Care     33 week 5 days     Pt presents to clinic today for prenatal care 33 week 5 days. Pt denies any bleeding, or leakage of fluid at this time. States baby is moving good. Patient denies contractions.   Initial /74   Pulse 91   Wt 96.6 kg (213 lb)   LMP 05/26/2022   BMI 34.38 kg/m   Estimated body mass index is 34.38 kg/m  as calculated from the following:    Height as of 7/21/22: 1.676 m (5' 6\").    Weight as of this encounter: 96.6 kg (213 lb).  Medication Reconciliation: complete          Anyi Doan, TIFFANIE  "

## 2023-01-29 ENCOUNTER — HEALTH MAINTENANCE LETTER (OUTPATIENT)
Age: 28
End: 2023-01-29

## 2023-02-02 ENCOUNTER — HOSPITAL ENCOUNTER (OUTPATIENT)
Dept: ULTRASOUND IMAGING | Facility: OTHER | Age: 28
Discharge: HOME | End: 2023-02-02
Attending: OBSTETRICS & GYNECOLOGY
Payer: COMMERCIAL

## 2023-02-02 ENCOUNTER — ALLIED HEALTH/NURSE VISIT (OUTPATIENT)
Dept: OBGYN | Facility: OTHER | Age: 28
End: 2023-02-02
Attending: OBSTETRICS & GYNECOLOGY
Payer: COMMERCIAL

## 2023-02-02 VITALS
SYSTOLIC BLOOD PRESSURE: 110 MMHG | DIASTOLIC BLOOD PRESSURE: 78 MMHG | HEART RATE: 89 BPM | BODY MASS INDEX: 34.7 KG/M2 | WEIGHT: 215 LBS | OXYGEN SATURATION: 97 %

## 2023-02-02 DIAGNOSIS — O09.90 SUPERVISION OF HIGH-RISK PREGNANCY: Primary | ICD-10-CM

## 2023-02-02 DIAGNOSIS — O09.92 HRP (HIGH RISK PREGNANCY), SECOND TRIMESTER: ICD-10-CM

## 2023-02-02 PROCEDURE — 76819 FETAL BIOPHYS PROFIL W/O NST: CPT

## 2023-02-02 NOTE — PROGRESS NOTES
NST cat 1    Discussed her blood sugars, fastings going up to 105-110's  Increased lantus to 27 Units at bedtime, recheck next week, notify us if they aren't improving and if pp levels are going up as well.

## 2023-02-02 NOTE — PROGRESS NOTES
Patient presents to clinic for NST. Verified patient's last name and date of birth. Patient denies any contractions, bleeding or leaking of fluid. Patient placed on NST monitor and observed. NST strip brought to Dr. Noel for review. Dr. Noel went into room and spook with patient. Patient taken off NST with no issues.     Evie Baker RN on 2/2/2023 at 3:30 PM

## 2023-02-09 ENCOUNTER — HOSPITAL ENCOUNTER (OUTPATIENT)
Dept: ULTRASOUND IMAGING | Facility: OTHER | Age: 28
Discharge: HOME | End: 2023-02-09
Attending: OBSTETRICS & GYNECOLOGY
Payer: COMMERCIAL

## 2023-02-09 ENCOUNTER — PRENATAL OFFICE VISIT (OUTPATIENT)
Dept: OBGYN | Facility: OTHER | Age: 28
End: 2023-02-09
Attending: OBSTETRICS & GYNECOLOGY
Payer: COMMERCIAL

## 2023-02-09 VITALS
WEIGHT: 213 LBS | BODY MASS INDEX: 34.38 KG/M2 | DIASTOLIC BLOOD PRESSURE: 70 MMHG | SYSTOLIC BLOOD PRESSURE: 122 MMHG | HEART RATE: 90 BPM

## 2023-02-09 DIAGNOSIS — O09.92 HRP (HIGH RISK PREGNANCY), SECOND TRIMESTER: ICD-10-CM

## 2023-02-09 DIAGNOSIS — O24.419 GDM, CLASS A2: ICD-10-CM

## 2023-02-09 DIAGNOSIS — O09.92 HRP (HIGH RISK PREGNANCY), SECOND TRIMESTER: Primary | ICD-10-CM

## 2023-02-09 DIAGNOSIS — O09.93 SUPERVISION OF HIGH RISK PREGNANCY IN THIRD TRIMESTER: ICD-10-CM

## 2023-02-09 PROCEDURE — 76819 FETAL BIOPHYS PROFIL W/O NST: CPT

## 2023-02-09 PROCEDURE — 99207 PR OB VISIT-NO CHARGE - GICH ONLY: CPT | Performed by: OBSTETRICS & GYNECOLOGY

## 2023-02-09 PROCEDURE — 59025 FETAL NON-STRESS TEST: CPT | Performed by: OBSTETRICS & GYNECOLOGY

## 2023-02-09 NOTE — NURSING NOTE
"Chief Complaint   Patient presents with     Prenatal Care     35 week 5 days     Pt presents to clinic today for prenatal care 35 week 5 days. Pt denies any bleeding, or leakage of fluid at this time. States baby is moving good. Patient denies contractions.   Initial /70   Pulse 90   Wt 96.6 kg (213 lb)   LMP 05/26/2022   BMI 34.38 kg/m   Estimated body mass index is 34.38 kg/m  as calculated from the following:    Height as of 7/21/22: 1.676 m (5' 6\").    Weight as of this encounter: 96.6 kg (213 lb).  Medication Reconciliation: complete        Anyi Doan, TIFFANIE  "

## 2023-02-09 NOTE — PROGRESS NOTES
CC: Recheck OB visit at 35w5d    HPI: Miladis Harper presents for a routine OB visit now at 35w5d  Concerns: Blood sugar is running normal for postprandial. Low 100's for fastings on 29 Units.  Patient notices normal fetal movement, denies contractions, vaginal bleeding or leaking of fluid.    OB History    Para Term  AB Living   4 1 1 0 2 0   SAB IAB Ectopic Multiple Live Births   2 0 0 0 0      # Outcome Date GA Lbr Han/2nd Weight Sex Delivery Anes PTL Lv   4 Current            3 SAB 22     AB, MISSED      2 Term 21 37w0d 03:39 / 02:13 2.721 kg (6 lb)  Vag-Spont Local N FD      Name: WENDY HARPER FD      Apgar1: 0  Apgar5: 0   1 SAB 19 13w0d            Current Outpatient Medications   Medication     alcohol swab prep pads     alcohol swab prep pads     blood glucose (NO BRAND SPECIFIED) test strip     blood glucose (NO BRAND SPECIFIED) test strip     blood glucose calibration (NO BRAND SPECIFIED) solution     blood glucose calibration (NO BRAND SPECIFIED) solution     blood glucose monitoring (NO BRAND SPECIFIED) meter device kit     blood glucose monitoring (NO BRAND SPECIFIED) meter device kit     citalopram (CELEXA) 10 MG tablet     insulin pen needle (32G X 4 MM) 32G X 4 MM miscellaneous     Prenatal Vit-Fe Fumarate-FA (PRENATAL MULTIVITAMIN  PLUS IRON) 27-1 MG TABS     thin (NO BRAND SPECIFIED) lancets     thin (NO BRAND SPECIFIED) lancets     insulin glargine (LANTUS PEN) 100 UNIT/ML pen     No current facility-administered medications for this visit.     O: /70   Pulse 90   Wt 96.6 kg (213 lb)   LMP 2022   BMI 34.38 kg/m    Body mass index is 34.38 kg/m .  See OB flow sheet  EXAM:  NAD    NST doucmentation:    Indication:     (O09.93) Supervision of high risk pregnancy in third trimester  Comment:   Plan:     Duration: 30 min     35w5d  FHR baseline: 140 with moderate variability  Accelerations: y  Decelerations: n  Contractions: n    Category  1    No results found for any visits on 23.    A/P:  (O09.93) Supervision of high risk pregnancy in third trimester  Comment:   Plan:   Increased to 29 Units yesterday with improved control. Continue on 29U Lantus for another few days, OK to go up by 2-3 units if not below 100 then.    Recheck in 1 week    Problem List:   Pregnancy risk factors include:    History of 37 week IUFD, GDM on insulin, polyhydramnios- delivered vaginally  No explanatory findings on autopsy, under developed lungs.  Passed early GCT, recheck at 24-28 weeks- done today   SAB x 2  Asymptomatic bacteruria with E. Coli- treated 2022- recheck culture 10/3/2022 Mixed urogenital mireya   Celexa started 10/3/2022  GDM A2 on Lantus at 29 Units as of 2023    Mike Noel MD FACOG  2:31 PM 2023

## 2023-02-16 ENCOUNTER — HOSPITAL ENCOUNTER (OUTPATIENT)
Dept: ULTRASOUND IMAGING | Facility: OTHER | Age: 28
Discharge: HOME | End: 2023-02-16
Attending: OBSTETRICS & GYNECOLOGY
Payer: COMMERCIAL

## 2023-02-16 ENCOUNTER — PRENATAL OFFICE VISIT (OUTPATIENT)
Dept: OBGYN | Facility: OTHER | Age: 28
End: 2023-02-16
Attending: OBSTETRICS & GYNECOLOGY
Payer: COMMERCIAL

## 2023-02-16 VITALS
DIASTOLIC BLOOD PRESSURE: 76 MMHG | OXYGEN SATURATION: 98 % | RESPIRATION RATE: 14 BRPM | HEART RATE: 82 BPM | SYSTOLIC BLOOD PRESSURE: 126 MMHG

## 2023-02-16 DIAGNOSIS — O13.3 GESTATIONAL HYPERTENSION, THIRD TRIMESTER: Primary | ICD-10-CM

## 2023-02-16 DIAGNOSIS — O09.92 HRP (HIGH RISK PREGNANCY), SECOND TRIMESTER: ICD-10-CM

## 2023-02-16 PROCEDURE — 76816 OB US FOLLOW-UP PER FETUS: CPT

## 2023-02-16 PROCEDURE — 76819 FETAL BIOPHYS PROFIL W/O NST: CPT

## 2023-02-16 PROCEDURE — 59025 FETAL NON-STRESS TEST: CPT

## 2023-02-16 PROCEDURE — 99207 PR OB VISIT-NO CHARGE - GICH ONLY: CPT

## 2023-02-16 ASSESSMENT — PAIN SCALES - GENERAL: PAINLEVEL: NO PAIN (0)

## 2023-02-16 NOTE — NURSING NOTE
"Chief Complaint   Patient presents with     Prenatal Care     36w5d     Patient has no concerns today. BPP 8/8, currently on NST. Denies vaginal bleeding or leaking of fluid. Contractions are inconsistent and not strong. BS readings at home reported as WNL.  Initial /76 (BP Location: Right arm, Patient Position: Sitting, Cuff Size: Adult Regular)   Pulse 82   Resp 14   LMP 05/26/2022   SpO2 98%  Estimated body mass index is 34.38 kg/m  as calculated from the following:    Height as of 7/21/22: 1.676 m (5' 6\").    Weight as of 2/9/23: 96.6 kg (213 lb).  Medication Reconciliation: complete    Allyson Medina RN    " Regular rate & rhythm, normal S1, S2; no murmurs, gallops or rubs; no S3, S4 negative

## 2023-02-16 NOTE — PROGRESS NOTES
CC: Recheck OB visit at 36w5d    HPI: Miladis Harper presents for a routine OB visit now at 36w5d  Concerns: wondering about moving induction date up with chhaya, is still at 106 for fastings post prandials are okay per patient.   Patient notices normal fetal movement, denies contractions, vaginal bleeding or leaking of fluid.    OB History    Para Term  AB Living   4 1 1 0 2 0   SAB IAB Ectopic Multiple Live Births   2 0 0 0 0      # Outcome Date GA Lbr Han/2nd Weight Sex Delivery Anes PTL Lv   4 Current            3 SAB 22     AB, MISSED      2 Term 21 37w0d 03:39 / 02:13 2.721 kg (6 lb)  Vag-Spont Local N FD      Name: WENDY HARPER FD      Apgar1: 0  Apgar5: 0   1 SAB 19 13w0d            Current Outpatient Medications   Medication     alcohol swab prep pads     alcohol swab prep pads     blood glucose (NO BRAND SPECIFIED) test strip     blood glucose (NO BRAND SPECIFIED) test strip     blood glucose calibration (NO BRAND SPECIFIED) solution     blood glucose calibration (NO BRAND SPECIFIED) solution     blood glucose monitoring (NO BRAND SPECIFIED) meter device kit     blood glucose monitoring (NO BRAND SPECIFIED) meter device kit     citalopram (CELEXA) 10 MG tablet     insulin pen needle (32G X 4 MM) 32G X 4 MM miscellaneous     Prenatal Vit-Fe Fumarate-FA (PRENATAL MULTIVITAMIN  PLUS IRON) 27-1 MG TABS     thin (NO BRAND SPECIFIED) lancets     thin (NO BRAND SPECIFIED) lancets     insulin glargine (LANTUS PEN) 100 UNIT/ML pen     No current facility-administered medications for this visit.         O: /76 (BP Location: Right arm, Patient Position: Sitting, Cuff Size: Adult Regular)   Pulse 82   Resp 14   LMP 2022   SpO2 98%   There is no height or weight on file to calculate BMI.  See OB flow sheet  EXAM:  NAD  FH:40 cm  FHR baseline: 140 with moderate variability  Accelerations: y  Decelerations: n  Contractions: 1 in 30 mins     Category  1     Results for orders placed or performed during the hospital encounter of 02/16/23   US OB Biophys Single Gestation Measure     Status: None    Narrative    OB ULTRASOUND REPORT     Clinical:  Evaluate estimated fetal weight and biophysical profile  score  Gestation Number:  1  Presentation:    Cephalic  Lie:    Longitudinal  Cardiac Activity:   Irregular  BPM:  146. Heart rate varied between 130 and 160 beats minute.  Movement:  Yes  Placenta:   Fundal; grade 2  rdGrdrrdarddrderd:rd rd3rd Previa:  No Previa  Adnexa:    Not Visualized  Cervix:       Not assessed  Amniotic Fluid:    Normal  BRAD:  19.0 cm    Measurements:    BPD  39 weeks 0 days, 97%  HC  38 weeks 4 days, 66%  AC  39 weeks 6 days, greater than 98%  FL  37 weeks 2 days, 60%  Estimated Fetal Weight  3641 grams  HC/AC  0.94  US age (Current US)  38 weeks 5 days  Gestational Age by LMP  36 weeks 5 days  US EDC (First Study)    US EDC (Current Study)  3/11/2023     2/25/2023   %WT for EGA  (Based on First Study)  96 %      Structural Survey:    Stomach  Unremarkable  Kidneys  Unremarkable  Bladder  Unremarkable  3 Vessel Cord  Unremarkable  Cord Insertion  Not assessed  4 Chamber Heart  Unremarkable    Biophysical profile score:  Fetal Movement:  Score 2: At least 3 discrete body/limb movements in 30 minutes  Score 0: Up to 2 episodes of limb/body movements in 30 minutes                    FM = 2    Fetal Breathing movements:  Score 2: At least one episode, at least 30 seconds duration in 30  minutes of observation.  Score 0: Absent or no episodes of greater than 30 seconds    duration in 30 minutes observation.                    FBM = 2    Fetal Tone:  Score 2: At least one episode of active extension with return to     flexion of fetal limbs or trunk, opening and closing of     hands considered normal tone.  Score 0: Absent or no episodes in 30 minutes of observation.                    FT = 2    Amniotic Fluid Volume:  Score 2: At least one pocket of amniotic  fluid measuring at least    1 cm in two perpendicular planes.  Score 0: Either no amniotic fluid or a pocket less than 1 cm in    two perpendicular planes.                    AF = 2                        TOTAL = 8    BRAD: 19.0 cm    HRT Rate: 146 bpm; heart rate varies between 130 and 160 bpm    Placenta Location: Fundal    Placenta stGstrstastdstest:st st1st Impression    Impression:   Single living intrauterine pregnancy with a biophysical profile score  of 8/8.     Based on the current measurements, the estimated fetal weight is 3641  g which is at the 96th percentile.    MAINE HENRY MD         SYSTEM ID:  [id number]       A/P: 36w5d  (O09.93) Supervision of high risk pregnancy in third trimester  Comment:   Plan:   Increased to 30 Units last week with improved control but is still 106 fasting. Discussed going up by 2 units every 3 days until sugars are below 100 fasting.   They will check in with [place] on early induction if indicated.     Recheck in 1 week      Problem List:   Pregnancy risk factors include:    History of 37 week IUFD, GDM on insulin, polyhydramnios- delivered vaginally- planned for 38 week induction in [place]  No explanatory findings on autopsy, under developed lungs.  Passed early GCT, recheck at 24-28 weeks- failed  SAB x 2  Asymptomatic bacteruria with E. Coli- treated 2022- recheck culture 10/3/2022 Mixed urogenital mireya   Celexa stopped as of 23  GDM A2 on Lantus at 30 Units as of 23    VINI Tran CNP  3:17 PM 2023

## 2023-02-23 ENCOUNTER — HOSPITAL ENCOUNTER (OUTPATIENT)
Dept: ULTRASOUND IMAGING | Facility: OTHER | Age: 28
Discharge: HOME | End: 2023-02-23
Attending: OBSTETRICS & GYNECOLOGY
Payer: COMMERCIAL

## 2023-02-23 ENCOUNTER — PRENATAL OFFICE VISIT (OUTPATIENT)
Dept: OBGYN | Facility: OTHER | Age: 28
End: 2023-02-23
Attending: OBSTETRICS & GYNECOLOGY
Payer: COMMERCIAL

## 2023-02-23 VITALS
DIASTOLIC BLOOD PRESSURE: 76 MMHG | HEART RATE: 80 BPM | WEIGHT: 219 LBS | SYSTOLIC BLOOD PRESSURE: 128 MMHG | BODY MASS INDEX: 35.35 KG/M2

## 2023-02-23 DIAGNOSIS — O24.419 GESTATIONAL DIABETES MELLITUS (GDM) IN THIRD TRIMESTER, GESTATIONAL DIABETES METHOD OF CONTROL UNSPECIFIED: ICD-10-CM

## 2023-02-23 DIAGNOSIS — O09.92 HRP (HIGH RISK PREGNANCY), SECOND TRIMESTER: ICD-10-CM

## 2023-02-23 DIAGNOSIS — O09.92 HRP (HIGH RISK PREGNANCY), SECOND TRIMESTER: Primary | ICD-10-CM

## 2023-02-23 PROCEDURE — 59426 ANTEPARTUM CARE ONLY: CPT | Performed by: OBSTETRICS & GYNECOLOGY

## 2023-02-23 PROCEDURE — 59025 FETAL NON-STRESS TEST: CPT | Performed by: OBSTETRICS & GYNECOLOGY

## 2023-02-23 PROCEDURE — 76819 FETAL BIOPHYS PROFIL W/O NST: CPT

## 2023-02-23 PROCEDURE — 99207 PR OB VISIT-NO CHARGE - GICH ONLY: CPT | Performed by: OBSTETRICS & GYNECOLOGY

## 2023-02-23 ASSESSMENT — PAIN SCALES - GENERAL: PAINLEVEL: NO PAIN (0)

## 2023-02-23 NOTE — NURSING NOTE
Chief Complaint   Patient presents with     Prenatal Care     37w5d     Baby nice and active   Medication Reconciliation: complete    Floresita Owen LPN........................2/23/2023  3:16 PM

## 2023-02-23 NOTE — PROGRESS NOTES
CC: Recheck OB visit at 37w5d    HPI: Miladis Harper presents for a routine OB visit now at 37w5d  Concerns: 36 Units Lantus now. Fastings are 's, normal PP values at 1 hour  BPP shows normal AFV, 8/8    Patient notices normal fetal movement, denies contractions, vaginal bleeding or leaking of fluid.    OB History    Para Term  AB Living   4 1 1 0 2 0   SAB IAB Ectopic Multiple Live Births   2 0 0 0 0      # Outcome Date GA Lbr Han/2nd Weight Sex Delivery Anes PTL Lv   4 Current            3 SAB 22     AB, MISSED      2 Term 21 37w0d 03:39 / 02:13 2.721 kg (6 lb)  Vag-Spont Local N FD      Name: WENDY HARPER FD      Apgar1: 0  Apgar5: 0   1 SAB 19 13w0d            Current Outpatient Medications   Medication     alcohol swab prep pads     alcohol swab prep pads     blood glucose (NO BRAND SPECIFIED) test strip     blood glucose (NO BRAND SPECIFIED) test strip     blood glucose calibration (NO BRAND SPECIFIED) solution     blood glucose calibration (NO BRAND SPECIFIED) solution     blood glucose monitoring (NO BRAND SPECIFIED) meter device kit     blood glucose monitoring (NO BRAND SPECIFIED) meter device kit     citalopram (CELEXA) 10 MG tablet     insulin glargine (LANTUS PEN) 100 UNIT/ML pen     insulin pen needle (32G X 4 MM) 32G X 4 MM miscellaneous     Prenatal Vit-Fe Fumarate-FA (PRENATAL MULTIVITAMIN  PLUS IRON) 27-1 MG TABS     thin (NO BRAND SPECIFIED) lancets     thin (NO BRAND SPECIFIED) lancets     No current facility-administered medications for this visit.     O: LMP 2022   There is no height or weight on file to calculate BMI.  See OB flow sheet  EXAM:  NAD    NST doucmentation:    Indication: (O09.92) HRP (high risk pregnancy), second trimester  (primary encounter diagnosis)  Comment:   Plan:     (O24.419) Gestational diabetes mellitus (GDM) in third trimester, gestational diabetes method of control unspecified  Comment:   Plan: FETAL  NON-STRESS TEST            Duration: 30 min     37w5d  FHR baseline: 140 with moderate variability  Accelerations: y  Decelerations: n  Contractions: n    Category 1    Results for orders placed or performed during the hospital encounter of 02/23/23   US Fetal Biophys Prof w/o Non Stress Test     Status: None    Narrative    US OB FETAL BIOPHY PROFILE W/O NST SINGLE W/LTD    Exam reason: HRP (high risk pregnancy), second trimester    Comparison: 2/16/2023    Fetal Movement:  Score 2: At least 3 discrete body/limb movements in 30 minutes  Score 0: Up to 2 episodes of limb/body movements in 30 minutes                    FM = 2    Fetal Breathing movements:  Score 2: At least one episode, at least 30 seconds duration in 30  minutes of observation.  Score 0: Absent or no episodes of greater than 30 seconds    duration in 30 minutes observation.                    FBM = 2    Fetal Tone:  Score 2: At least one episode of active extension with return to     flexion of fetal limbs or trunk, opening and closing of     hands considered normal tone.  Score 0: Absent or no episodes in 30 minutes of observation.                    FT = 2    Amniotic Fluid Volume:  Score 2: At least one pocket of amniotic fluid measuring at least    2 cm in two perpendicular planes.  Score 0: Either no amniotic fluid or a pocket less than 2 cm in    two perpendicular planes.                    AF = 2                        TOTAL = 8/8    BRAD: 14.8 cm  HRT Rate: 153 bpm  Placenta Location: Fundal  Placenta stGstrstastdstest:st st1st Position: Cephalic      Impression    IMPRESSION:    Biophysical profile score 8/8.    NORIS LEMUS MD         SYSTEM ID:  [id number]       A/P: (O09.92) HRP (high risk pregnancy), second trimester  (primary encounter diagnosis)  Comment:   Plan:     (O24.419) Gestational diabetes mellitus (GDM) in third trimester, gestational diabetes method of control unspecified  Comment:   Plan:     Recheck in 1 weeks    Problem List:    Pregnancy risk factors include:    History of 37 week IUFD, GDM on insulin, polyhydramnios- delivered vaginally  No explanatory findings on autopsy, under developed lungs.  Passed early GCT, recheck at 24-28 weeks- done today   SAB x 2  Asymptomatic bacteruria with E. Coli- treated 2022- recheck culture 10/3/2022 Mixed urogenital mireya   Celexa started 10/3/2022  GDM A2 on Lantus at 36 2023    Mike Noel MD FACOG  3:28 PM 2023

## 2023-06-12 ENCOUNTER — THERAPY VISIT (OUTPATIENT)
Dept: CHIROPRACTIC MEDICINE | Facility: OTHER | Age: 28
End: 2023-06-12
Attending: CHIROPRACTOR
Payer: COMMERCIAL

## 2023-06-12 VITALS — HEART RATE: 85 BPM | TEMPERATURE: 97.9 F | OXYGEN SATURATION: 98 % | RESPIRATION RATE: 16 BRPM

## 2023-06-12 DIAGNOSIS — M99.04 SEGMENTAL AND SOMATIC DYSFUNCTION OF SACRAL REGION: ICD-10-CM

## 2023-06-12 DIAGNOSIS — M54.50 CHRONIC LOW BACK PAIN WITHOUT SCIATICA, UNSPECIFIED BACK PAIN LATERALITY: ICD-10-CM

## 2023-06-12 DIAGNOSIS — G89.29 CHRONIC LOW BACK PAIN WITHOUT SCIATICA, UNSPECIFIED BACK PAIN LATERALITY: ICD-10-CM

## 2023-06-12 DIAGNOSIS — M54.2 CERVICALGIA: ICD-10-CM

## 2023-06-12 DIAGNOSIS — M99.02 SEGMENTAL AND SOMATIC DYSFUNCTION OF THORACIC REGION: ICD-10-CM

## 2023-06-12 DIAGNOSIS — M54.9 UPPER BACK PAIN ON RIGHT SIDE: ICD-10-CM

## 2023-06-12 DIAGNOSIS — M99.01 SEGMENTAL AND SOMATIC DYSFUNCTION OF CERVICAL REGION: Primary | ICD-10-CM

## 2023-06-12 PROCEDURE — 98941 CHIROPRACT MANJ 3-4 REGIONS: CPT | Mod: AT | Performed by: CHIROPRACTOR

## 2023-06-12 PROCEDURE — 99212 OFFICE O/P EST SF 10 MIN: CPT | Mod: 25 | Performed by: CHIROPRACTOR

## 2023-06-12 NOTE — PROGRESS NOTES
Neck and bilateral upper back are constantly aching, pressure, and tight. right side is worse.  4/10 W24 6/10. Bilateral lower back and hips are constantly tight, aching, and pulling.   Caryl Ryan on 6/12/2023 at 2:10 PM    Reviewed by EW    Visit #:  1    Subjective:  Miladis Harper is a 27 year old female who is seen for:        Segmental and somatic dysfunction of cervical region  Segmental and somatic dysfunction of thoracic region  Segmental and somatic dysfunction of sacral region  Cervicalgia  Upper back pain on right side  Chronic low back pain without sciatica, unspecified back pain laterality.      Miladis Harper reports:gave birth in March.  Has been noticing increasing levels of neck/upper back pain primarily on the right side as well as low back pain.  Progressively been worsening over the past 5-6 weeks.  Other than giving birth in March no specific traumatic events or overuse injuries.  Patient has not been exercising like she normally does which she believes to be another contributing factor.  Symptoms most noted when she is laying in bed and attempting to nurse her child.  Denies any radicular complaints of the upper or lower extremities, no loss of bowel bladder function, or saddle paresthesia.    (DVPRS) Pain Rating Score : 4-Distracts me, can do usual activities (W24 4/10) (06/12/23 1409)     Objective:  The following was observed:  Pulse 85   Temp 97.9  F (36.6  C) (Tympanic)   Resp 16   SpO2 98%        6/12/2023     2:11 PM   Neck Disability Index (  Jeff H. and Wili C. 1991. All rights reserved.; used with permission)   SECTION 1 - PAIN INTENSITY 1   SECTION 2 - PERSONAL CARE 0   SECTION 3 - LIFTING 0   SECTION 4 - READING 3   SECTION 5 - HEADACHES 0   SECTION 6 - CONCENTRATION 0   SECTION 7 - WORK 0   SECTION 8 - DRIVING 0   SECTION 9 - SLEEPING 0   SECTION 10 - RECREATION 0   Count 10   Sum 4   Raw Score: /50 4   Neck Disability Index Score: (%) 8 %      Cervical AROM:  Appears within normal limits    Cervical compression: + Focal neck pain, negative for radiculopathy  Cervical distraction: +    Oswestry (CHRISTIE) Questionnaire        6/12/2023     2:12 PM   OSWESTRY DISABILITY INDEX   Count 9   Sum 8   Oswestry Score (%) 17.78 %      Thoracic/lumbar AROM: Generally unremarkable    SLR: -right, +left  Ely's: +right, +left    P: palpatory tenderness None reported by patient:    A: static palpation demonstrates intersegmental asymmetry , cervical, thoracic, pelvis  R: motion palpation notes restricted motion, C2 , C5 , T2 , T6 , T12  and Sacrum   T: Marked spasm right upper trapezius, mild spasms paraspinal cervical, thoracic regions bilaterally, mild left quadratus lumborum    Segmental spinal dysfunction/restrictions found at:  :  C2 Right lateral flexion restricted and Extension restriction  C5 Left lateral flexion restricted and Extension restriction  T2 Right lateral flexion restricted and Extension restriction  T6 Extension restriction  T12 Right lateral flexion restricted and Extension restriction  Sacrum Left lateral flexion restricted and Extension restriction.      Assessment: Segmental/somatic dysfunction present of the cervical, thoracic, pelvic regions all of which are consistent with patient's subjective reports and objective findings from today's exam.  Patient has been under our care in the past with similar complaints and typically responds quite favorably with adjustments and I believe that to be the case currently.  There are no contraindications precluding patient from receiving care today.  Patient may still benefit from chiropractic services within the next 4-6 weeks which will be done on as-needed basis.    Diagnoses:      1. Segmental and somatic dysfunction of cervical region    2. Segmental and somatic dysfunction of thoracic region    3. Segmental and somatic dysfunction of sacral region    4. Cervicalgia    5. Upper back pain on right side    6. Chronic low  back pain without sciatica, unspecified back pain laterality        Patient's condition:  Patient had restrictions pre-manipulation    Treatment effectiveness:  Post manipulation there is better intersegmental movement and Patient claims to feel looser post manipulation      Procedures:  E/M 46836    CMT:  98609 Chiropractic manipulative treatment 3-4 regions performed   Cervical: Diversified, C2, C5 , Supine  Thoracic: Diversified, T2, T6, T12, Prone  Pelvis: Diversified, Sacrum , Side posture    Modalities:  None performed this visit    Therapeutic procedures:  None    Response to Treatment  Reduction in symptoms as reported by patient    Prognosis: Excellent    Progress towards Goals: Reduce pain 50%  Reduce positive orthopedic findings  Reduce indices 20-30%    Recommendations:    Instructions:ice 20 minutes every other hour as needed and heat 15 minutes every other hour as needed    Follow-up:  Return to care if symptoms persist.

## 2023-06-21 ENCOUNTER — THERAPY VISIT (OUTPATIENT)
Dept: CHIROPRACTIC MEDICINE | Facility: OTHER | Age: 28
End: 2023-06-21
Attending: CHIROPRACTOR
Payer: COMMERCIAL

## 2023-06-21 VITALS — OXYGEN SATURATION: 97 % | TEMPERATURE: 97.4 F | HEART RATE: 78 BPM | RESPIRATION RATE: 16 BRPM

## 2023-06-21 DIAGNOSIS — G89.29 CHRONIC LOW BACK PAIN WITHOUT SCIATICA, UNSPECIFIED BACK PAIN LATERALITY: ICD-10-CM

## 2023-06-21 DIAGNOSIS — M99.04 SEGMENTAL AND SOMATIC DYSFUNCTION OF SACRAL REGION: ICD-10-CM

## 2023-06-21 DIAGNOSIS — M54.50 CHRONIC LOW BACK PAIN WITHOUT SCIATICA, UNSPECIFIED BACK PAIN LATERALITY: ICD-10-CM

## 2023-06-21 DIAGNOSIS — M99.01 SEGMENTAL AND SOMATIC DYSFUNCTION OF CERVICAL REGION: Primary | ICD-10-CM

## 2023-06-21 DIAGNOSIS — M54.2 CERVICALGIA: ICD-10-CM

## 2023-06-21 DIAGNOSIS — M99.02 SEGMENTAL AND SOMATIC DYSFUNCTION OF THORACIC REGION: ICD-10-CM

## 2023-06-21 DIAGNOSIS — M54.9 UPPER BACK PAIN ON RIGHT SIDE: ICD-10-CM

## 2023-06-21 PROCEDURE — 98941 CHIROPRACT MANJ 3-4 REGIONS: CPT | Mod: AT | Performed by: CHIROPRACTOR

## 2023-06-21 NOTE — PROGRESS NOTES
Bilateral neck and upper back mostly on the right with constant pinching. 4/10 W24 6/10.    Bilateral lower back and hip more on the right with constant dull aching, pinching, and tightness. 1/10 W24 4/10.   Ivette Jackson on 6/21/2023 at 11:40 AM    Reviewed by EW    Visit #:  2    Subjective:  Miladis Harper is a 28 year old female who is seen in f/u up for:        Segmental and somatic dysfunction of cervical region  Segmental and somatic dysfunction of thoracic region  Segmental and somatic dysfunction of sacral region  Cervicalgia  Upper back pain on right side  Chronic low back pain without sciatica, unspecified back pain laterality.     Since last visit on 6/12/2023,  Miladis Harper reports: Symptoms improved for a couple of days before progressively worsening to current levels.  Continues to have most pain and difficulty in the CT junction when lying supine in her bed trying to nurse her infant.    Infant and  in attendance today.     (DVPRS) Pain Rating Score : 1-Hardly notice pain (W24 4/10) (06/21/23 1138)     Objective:  The following was observed:  Pulse 78   Temp 97.4  F (36.3  C) (Tympanic)   Resp 16   SpO2 97%      P: palpatory tenderness None reported by patient:    A: static palpation demonstrates intersegmental asymmetry , cervical, thoracic, pelvis  R: motion palpation notes restricted motion, C2 , C5 , T2 , T6 , T12  and Sacrum   T: Marked spasm right upper trapezius, mild spasms paraspinal cervical, thoracic regions bilaterally, mild left quadratus lumborum     Segmental spinal dysfunction/restrictions found at:  :  C2 Right lateral flexion restricted and Extension restriction  C5 Left lateral flexion restricted and Extension restriction  T2 Right lateral flexion restricted and Extension restriction  T6 Extension restriction  T12 Right lateral flexion restricted and Extension restriction  Sacrum Left lateral flexion restricted and Extension restriction.      Assessment:  Continuation of symptoms.  Suspect patient would continue to benefit from care which will be done on as-needed basis at this time.    Diagnoses:      1. Segmental and somatic dysfunction of cervical region    2. Segmental and somatic dysfunction of thoracic region    3. Segmental and somatic dysfunction of sacral region    4. Cervicalgia    5. Upper back pain on right side    6. Chronic low back pain without sciatica, unspecified back pain laterality        Patient's condition:  Patient had restrictions pre-manipulation    Treatment effectiveness:  Post manipulation there is better intersegmental movement and Patient claims to feel looser post manipulation      Procedures:  CMT:  35647 Chiropractic manipulative treatment 3-4 regions performed   Cervical: Diversified, C2, C5 , Supine  Thoracic: Diversified, T2, T6, T12, Prone  Pelvis: Diversified, Sacrum , Side posture     Modalities:  None performed this visit     Therapeutic procedures:  None     Response to Treatment  Reduction in symptoms as reported by patient     Prognosis: Excellent     Progress towards Goals: Reduce pain 50%  Reduce positive orthopedic findings  Reduce indices 20-30%     Recommendations:     Instructions:ice 20 minutes every other hour as needed and heat 15 minutes every other hour as needed     Follow-up:  Return to care if symptoms persist.

## 2023-06-30 ENCOUNTER — THERAPY VISIT (OUTPATIENT)
Dept: CHIROPRACTIC MEDICINE | Facility: OTHER | Age: 28
End: 2023-06-30
Attending: CHIROPRACTOR
Payer: COMMERCIAL

## 2023-06-30 VITALS — RESPIRATION RATE: 16 BRPM | HEART RATE: 68 BPM | OXYGEN SATURATION: 97 % | TEMPERATURE: 97.8 F

## 2023-06-30 DIAGNOSIS — G89.29 CHRONIC LOW BACK PAIN WITHOUT SCIATICA, UNSPECIFIED BACK PAIN LATERALITY: ICD-10-CM

## 2023-06-30 DIAGNOSIS — M54.2 CERVICALGIA: ICD-10-CM

## 2023-06-30 DIAGNOSIS — M54.50 CHRONIC LOW BACK PAIN WITHOUT SCIATICA, UNSPECIFIED BACK PAIN LATERALITY: ICD-10-CM

## 2023-06-30 DIAGNOSIS — M99.01 SEGMENTAL AND SOMATIC DYSFUNCTION OF CERVICAL REGION: Primary | ICD-10-CM

## 2023-06-30 DIAGNOSIS — M99.02 SEGMENTAL AND SOMATIC DYSFUNCTION OF THORACIC REGION: ICD-10-CM

## 2023-06-30 DIAGNOSIS — M54.9 UPPER BACK PAIN ON RIGHT SIDE: ICD-10-CM

## 2023-06-30 PROCEDURE — 98940 CHIROPRACT MANJ 1-2 REGIONS: CPT | Mod: AT | Performed by: CHIROPRACTOR

## 2023-06-30 NOTE — PROGRESS NOTES
Right upper back is constantly aching. Bilateral upper back and neck is constantly tight. 3/10 W24 5/10. Using a massage which helps to decrease pain. 1/10 W24 1/10.   Caryl Ryan on 6/30/2023 at 11:42 AM    Reviewed by EW    Visit #:  3    Subjective:  Miladis Harper is a 28 year old female who is seen in f/u up for:        Segmental and somatic dysfunction of cervical region  Segmental and somatic dysfunction of thoracic region  Cervicalgia  Upper back pain on right side  Chronic low back pain without sciatica, unspecified back pain laterality.     Since last visit on 6/21/2023,  Miladis Harper reports: Was doing well after last visit however patient began noticing symptoms again to return and then further exacerbated after recent camping trip.    (DVPRS) Pain Rating Score : 1-Hardly notice pain (W24 1/10) (06/30/23 1140)     Objective:  The following was observed:  Pulse 68   Temp 97.8  F (36.6  C) (Tympanic)   Resp 16   SpO2 97%      P: palpatory tendernessSub-occipital and Right CT junction, TL junction:    A: static palpation demonstrates intersegmental asymmetry , cervical, thoracic  R: motion palpation notes restricted motion, C1 , C2 , T2 , T6  and T12   T: muscle spasm at level(s): Suboccipitals bilaterally, right upper trapezius, right rhomboids, intercostal musculature right side, right latissimus dorsi:      Segmental spinal dysfunction/restrictions found at:  :  C1 Left rotation restricted and Right lateral flexion restricted  C2 Left lateral flexion restricted and Extension restriction  T2 Left lateral flexion restricted and Extension restriction  T6 Right lateral flexion restricted and Extension restriction  T12 Right lateral flexion restricted and Extension restriction.      Assessment: Exacerbation of neck and back concerns.  There is a substantial amount of muscular spasm primarily on the right side which does seem to fit patient subjective report.  Follow-up with patient if needed at  this time.    Diagnoses:      1. Segmental and somatic dysfunction of cervical region    2. Segmental and somatic dysfunction of thoracic region    3. Cervicalgia    4. Upper back pain on right side    5. Chronic low back pain without sciatica, unspecified back pain laterality        Patient's condition:  Patient had restrictions pre-manipulation    Treatment effectiveness:  Post manipulation there is better intersegmental movement and Patient claims to feel looser post manipulation      Procedures:  CMT:  28940 Chiropractic manipulative treatment 1-2 regions performed   Cervical: Diversified, C1 , C2, Supine  Thoracic: Diversified, T2, T6, T12, Prone    Modalities:  None performed this visit    Therapeutic procedures:  None    Response to Treatment  Reduction in symptoms as reported by patient    Prognosis: Good    Progress towards Goals: acute exacerbation, goals in progress    Recommendations:    Instructions:none    Follow-up:  Return to care if symptoms persist.

## 2024-02-25 ENCOUNTER — HEALTH MAINTENANCE LETTER (OUTPATIENT)
Age: 29
End: 2024-02-25

## 2024-08-16 ENCOUNTER — MYC MEDICAL ADVICE (OUTPATIENT)
Dept: OBGYN | Facility: OTHER | Age: 29
End: 2024-08-16

## 2024-08-16 ENCOUNTER — VIRTUAL VISIT (OUTPATIENT)
Dept: OBGYN | Facility: OTHER | Age: 29
End: 2024-08-16
Attending: OBSTETRICS & GYNECOLOGY

## 2024-08-16 DIAGNOSIS — O36.80X0 ENCOUNTER TO DETERMINE FETAL VIABILITY OF PREGNANCY: Primary | ICD-10-CM

## 2024-08-16 PROCEDURE — 99207 PR OB VISIT-NO CHARGE - GICH ONLY: CPT | Mod: 93

## 2024-08-16 ASSESSMENT — PATIENT HEALTH QUESTIONNAIRE - PHQ9
5. POOR APPETITE OR OVEREATING: NOT AT ALL
SUM OF ALL RESPONSES TO PHQ QUESTIONS 1-9: 8

## 2024-08-16 ASSESSMENT — ANXIETY QUESTIONNAIRES
2. NOT BEING ABLE TO STOP OR CONTROL WORRYING: SEVERAL DAYS
1. FEELING NERVOUS, ANXIOUS, OR ON EDGE: NOT AT ALL
6. BECOMING EASILY ANNOYED OR IRRITABLE: SEVERAL DAYS
GAD7 TOTAL SCORE: 4
3. WORRYING TOO MUCH ABOUT DIFFERENT THINGS: MORE THAN HALF THE DAYS
7. FEELING AFRAID AS IF SOMETHING AWFUL MIGHT HAPPEN: NOT AT ALL
5. BEING SO RESTLESS THAT IT IS HARD TO SIT STILL: NOT AT ALL
IF YOU CHECKED OFF ANY PROBLEMS ON THIS QUESTIONNAIRE, HOW DIFFICULT HAVE THESE PROBLEMS MADE IT FOR YOU TO DO YOUR WORK, TAKE CARE OF THINGS AT HOME, OR GET ALONG WITH OTHER PEOPLE: SOMEWHAT DIFFICULT
GAD7 TOTAL SCORE: 4

## 2024-08-16 NOTE — PROGRESS NOTES
Verbal consent obtained for telephone visit. Total length of call: 30 min    HPI:    This is a 29 year old female patient,  who was called today for OB Intake visit. Patient reports positive pregnancy test at home.     Obstetrical history and OB Demographics updated to the best of this nurse's ability based on patient report. PHQ-9 depression screening and routine Domestic Abuse screening completed. All immediate questions and concerns answered.    FOOD SECURITY SCREENING QUESTIONS:    The next two questions are to help us understand your food security.  If you are feeling you need any assistance in this area, we have resources available to support you today.    Hunger Vital Signs:  Within the past 12 months we worried whether our food would run out before we got money to buy more. Never  Within the past 12 months the food we bought just didn't last and we didn't have money to get more. Never    Last menstrual period is reported as Patient's last menstrual period was 2024 (approximate). MANUEL based on LMP is Estimated Date of Delivery: Mar 19, 2025.  Her cycles are regular.  Her last menstrual period was abnormal. States she had a 10 day delay for her period.   Since her LMP, she has experienced  nausea, fatigue, loss of appetite, urinary urgency, urinary frequency, and hemorrhoids. States she does constipation problems.       OBSTETRIC HISTORY:    OB History    Para Term  AB Living   5 2 2 0 2 1   SAB IAB Ectopic Multiple Live Births   2 0 0 0 1      # Outcome Date GA Lbr Han/2nd Weight Sex Type Anes PTL Lv   5 Current            4 Term 23 38w6d / 00:22 4.478 kg (9 lb 14 oz) F Vag-Spont None N JADYN      Name: BRYANTGIRL A ALTON      Apgar1: 8  Apgar5: 9   3 SAB 22     AB, MISSED      2 Term 21 37w0d 03:39 / 02:13 2.722 kg (6 lb) M Vag-Spont Local N FD      Birth Comments: fetal demise, severe poly 5 amnioreductions club feet 2nd degree perineal laceration repaired       Name: WENDY HURTADO FD      Apgar1: 0  Apgar5: 0   1 SAB 09/01/19 13w0d              Age of first pregnancy: 24  Previous OB Provider: Dr. Powell.   Previous Delivering Clinic: [facility].   Release of Records: NA    Current delivery plan: TBD. Wants a home birth put wants to establish care and make sure baby is healthy. States she is willing to come in if she has gestational diabetes and it's not control.   Preferred OB Provider: [facility].   Current Primary Care Provider: Doesn't have one.   Pediatrician: [facility]    Additional History: history of stillbirth and miscarriages.     Have you travelled during the pregnancy?No  Have your sexual partner(s) travelled during the pregnancy?No      HISTORY:   Planned Pregnancy: Yes  Marital Status:   Occupation: Stay at home mom   Living in Household: Spouse and Children  FOB: Andres  Father of the baby is involved.   Family and father of baby is supportive of current pregnancy.  Past Medical History of Father of Baby:No significant medical history    Past History:  Her past medical history   Past Medical History:   Diagnosis Date    Depressive disorder     Gestational diabetes    .      Her past surgical history: History reviewed. No pertinent surgical history.    She has a history of  gestational diabetes, insulin controlled    Since her last LMP she denies use of alcohol, tobacco and street drugs.    Pap smear history: Last one 11/03/21.     STD/STI history: No STD history    STD/STI symptoms: None     Past medical, surgical, social and family history were reviewed and updated in EPIC.    Medications reviewed by this nurse. Current medication list:  Current Outpatient Medications   Medication Sig Dispense Refill    Prenatal Vit-Fe Fumarate-FA (PRENATAL MULTIVITAMIN  PLUS IRON) 27-1 MG TABS Take 1 tablet by mouth daily      alcohol swab prep pads Use to swab area of injection/abad as directed. (Patient not taking: Reported on 8/16/2024) 400 each 3    alcohol swab  prep pads Use to swab area of injection/abad as directed. (Patient not taking: Reported on 8/16/2024) 400 each 3    blood glucose (NO BRAND SPECIFIED) test strip Use to test blood sugar 4 times daily or as directed. To accompany: Blood Glucose Monitor Brands: per insurance. (Patient not taking: Reported on 8/16/2024) 400 strip 3    blood glucose (NO BRAND SPECIFIED) test strip Use to test blood sugar 4 times daily or as directed. To accompany: Blood Glucose Monitor Brands: per insurance. (Patient not taking: Reported on 8/16/2024) 400 strip 3    blood glucose calibration (NO BRAND SPECIFIED) solution To accompany: Blood Glucose Monitor Brands: per insurance. (Patient not taking: Reported on 8/16/2024) 1 each 0    blood glucose calibration (NO BRAND SPECIFIED) solution To accompany: Blood Glucose Monitor Brands: per insurance. 1 each 0    blood glucose monitoring (NO BRAND SPECIFIED) meter device kit Use to test blood sugar 4 times daily or as directed. Preferred blood glucose meter OR supplies to accompany: Blood Glucose Monitor Brands: per insurance. (Patient not taking: Reported on 8/16/2024) 1 kit 0    blood glucose monitoring (NO BRAND SPECIFIED) meter device kit Use to test blood sugar 4 times daily or as directed. Preferred blood glucose meter OR supplies to accompany: Blood Glucose Monitor Brands: per insurance. (Patient not taking: Reported on 8/16/2024) 1 kit 0    citalopram (CELEXA) 10 MG tablet Take 1 tablet (10 mg) by mouth daily (Patient not taking: Reported on 8/16/2024) 90 tablet 3    insulin glargine (LANTUS PEN) 100 UNIT/ML pen Inject 8 Units Subcutaneous At Bedtime for 30 days 15 mL 3    insulin pen needle (32G X 4 MM) 32G X 4 MM miscellaneous Use 1 pen needles daily or as directed. (Patient not taking: Reported on 8/16/2024) 100 each 1    thin (NO BRAND SPECIFIED) lancets Use with lanceting device. To accompany: Blood Glucose Monitor Brands: per insurance. (Patient not taking: Reported on  2024) 400 each 3    thin (NO BRAND SPECIFIED) lancets Use with lanceting device. To accompany: Blood Glucose Monitor Brands: per insurance. (Patient not taking: Reported on 2024) 400 each 3     The following medications were recommended to be discontinued due to Pregnancy Category D status: NSAIDS   Patient informed to contact her primary care provider as soon as possible to discuss a safer alternative.    Risk factors:  Moderate and moderately severe risks (consult with OB/Gyn)  Previous fetal or  demise: Yes  History of  delivery: No  History of heart disease Class I: No  Severe anemia, unresponsive to iron therapy: No  Pelvic mass or neoplasm: No  Previous : No  Hyper/hypothyroidism: No  History of postpartum hemorrhage requiring transfusion:No  History of Placenta Accreta: No    High Risk (Pregnancy managed by OB/Gyn)  Multiple pregnancy: No  Pre-gestational diabetes: Yes  Chronic Hypertension: No  Renal Failure: No  Heart disease, class II or greater: No  Rh Isoimmunization: No  Chronic active hepatitis: No  Convulsive disorder, poorly controlled: No  Isoimmune thrombocytopenia: No  Pre-term premature rupture of membranes: No  Lupus or other autoimmune disorder: No  Human Immunodeficiency Virus: No      ASSESSMENT/PLAN:       ICD-10-CM    1. Encounter to determine fetal viability of pregnancy  O36.80X0           29 year old , 9w2d of pregnancy with MANUEL of 3/19/2025, by Last Menstrual Period    Per standing orders and scope of practice of this nurse, patient will have the following orders placed and completed prior to initial OB visit with the appropriate provider:    --early ultrasound for dating and viability ordered for 6+ weeks gestation based on LMP    --Quantitative Beta HCG and progesterone monitoring if indicated    Counseling given:     - Recommended weight gain for pregnancy: < 15 lbs.   BMI < 18.5  28-40 lbs   18.5 - 24.9 25-35   25 - 29.9 15-25   > 30  < 15        PLAN/PATIENT INSTRUCTIONS:    Normal exercise.  Normal sexual activity.  Prenatal vitamins.  Anticipated weight gain.    Follow-up appointment with Dr. Meyer for pre- care and take multivitamin or pre- vitamins.     Patrick Valadez RN.................................................. 2024 1:33 PM

## 2024-09-04 ENCOUNTER — TELEPHONE (OUTPATIENT)
Dept: OBGYN | Facility: OTHER | Age: 29
End: 2024-09-04

## 2024-09-04 NOTE — TELEPHONE ENCOUNTER
Miladis canceled her Ultrasound and OB Px with us.  She stated that she will be going elsewhere for her OB care.  Milagros Mayes on 9/4/2024 at 2:03 PM

## 2024-11-27 ENCOUNTER — PRENATAL OFFICE VISIT (OUTPATIENT)
Dept: FAMILY MEDICINE | Facility: OTHER | Age: 29
End: 2024-11-27
Attending: STUDENT IN AN ORGANIZED HEALTH CARE EDUCATION/TRAINING PROGRAM

## 2024-11-27 VITALS
SYSTOLIC BLOOD PRESSURE: 108 MMHG | HEART RATE: 77 BPM | OXYGEN SATURATION: 99 % | WEIGHT: 208 LBS | BODY MASS INDEX: 33.43 KG/M2 | HEIGHT: 66 IN | TEMPERATURE: 98.3 F | DIASTOLIC BLOOD PRESSURE: 62 MMHG

## 2024-11-27 DIAGNOSIS — O24.419 GESTATIONAL DIABETES MELLITUS (GDM) IN SECOND TRIMESTER, GESTATIONAL DIABETES METHOD OF CONTROL UNSPECIFIED: ICD-10-CM

## 2024-11-27 DIAGNOSIS — O09.90 HIGH-RISK PREGNANCY, UNSPECIFIED TRIMESTER: ICD-10-CM

## 2024-11-27 DIAGNOSIS — R30.0 DYSURIA: Primary | ICD-10-CM

## 2024-11-27 LAB
ALBUMIN UR-MCNC: NEGATIVE MG/DL
APPEARANCE UR: CLEAR
BACTERIAL VAGINOSIS VAG-IMP: NEGATIVE
BILIRUB UR QL STRIP: NEGATIVE
C TRACH DNA SPEC QL PROBE+SIG AMP: NEGATIVE
CANDIDA DNA VAG QL NAA+PROBE: NOT DETECTED
CANDIDA GLABRATA / CANDIDA KRUSEI DNA: NOT DETECTED
COLOR UR AUTO: ABNORMAL
GLUCOSE UR STRIP-MCNC: NEGATIVE MG/DL
HGB UR QL STRIP: NEGATIVE
KETONES UR STRIP-MCNC: NEGATIVE MG/DL
LEUKOCYTE ESTERASE UR QL STRIP: NEGATIVE
MUCOUS THREADS #/AREA URNS LPF: PRESENT /LPF
N GONORRHOEA DNA SPEC QL NAA+PROBE: NEGATIVE
NITRATE UR QL: NEGATIVE
PH UR STRIP: 6.5 [PH] (ref 4.7–8)
RBC URINE: 1 /HPF
SP GR UR STRIP: 1.01 (ref 1–1.03)
SQUAMOUS EPITHELIAL: 2 /HPF
T VAGINALIS DNA VAG QL NAA+PROBE: NOT DETECTED
UROBILINOGEN UR STRIP-MCNC: NORMAL MG/DL
WBC URINE: <1 /HPF

## 2024-11-27 PROCEDURE — 87491 CHLMYD TRACH DNA AMP PROBE: CPT | Performed by: STUDENT IN AN ORGANIZED HEALTH CARE EDUCATION/TRAINING PROGRAM

## 2024-11-27 PROCEDURE — 87591 N.GONORRHOEAE DNA AMP PROB: CPT | Performed by: STUDENT IN AN ORGANIZED HEALTH CARE EDUCATION/TRAINING PROGRAM

## 2024-11-27 PROCEDURE — 0352U MULTIPLEX VAGINAL PANEL BY PCR: CPT | Performed by: STUDENT IN AN ORGANIZED HEALTH CARE EDUCATION/TRAINING PROGRAM

## 2024-11-27 PROCEDURE — 81001 URINALYSIS AUTO W/SCOPE: CPT | Performed by: STUDENT IN AN ORGANIZED HEALTH CARE EDUCATION/TRAINING PROGRAM

## 2024-11-27 RX ORDER — ACYCLOVIR 400 MG/1
1 TABLET ORAL ONCE
Qty: 1 EACH | Refills: 0 | Status: SHIPPED | OUTPATIENT
Start: 2024-11-27 | End: 2024-11-27

## 2024-11-27 RX ORDER — ACYCLOVIR 400 MG/1
1 TABLET ORAL
Qty: 9 EACH | Refills: 5 | Status: SHIPPED | OUTPATIENT
Start: 2024-11-27

## 2024-11-27 ASSESSMENT — PATIENT HEALTH QUESTIONNAIRE - PHQ9
SUM OF ALL RESPONSES TO PHQ QUESTIONS 1-9: 5
10. IF YOU CHECKED OFF ANY PROBLEMS, HOW DIFFICULT HAVE THESE PROBLEMS MADE IT FOR YOU TO DO YOUR WORK, TAKE CARE OF THINGS AT HOME, OR GET ALONG WITH OTHER PEOPLE: NOT DIFFICULT AT ALL
SUM OF ALL RESPONSES TO PHQ QUESTIONS 1-9: 5

## 2024-11-27 ASSESSMENT — PAIN SCALES - GENERAL: PAINLEVEL_OUTOF10: NO PAIN (0)

## 2024-11-27 NOTE — PROGRESS NOTES
Fasting has been 110-120.  When strict, was  Had stillborn boy  1 living kid- 20 months- Will be two in March  3 miscarriages and stillborn- 37 weeks. Cause of death was unknown.  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  Has not taken any insulin   5.8  11/13- 5.3  [place].    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  Lantus did not work.        Fasting and preprandial blood glucose concentration: <95 mg/dL (5.3 mmol/L)  ?One-hour postprandial blood glucose concentration: <140 mg/dL (7.8 mmol/L)  ?Two-hour postprandial glucose concentration: <120 mg/dL (6.7 mmol/L)      Answers submitted by the patient for this visit:  Patient Health Questionnaire (Submitted on 11/27/2024)  If you checked off any problems, how difficult have these problems made it for you to do your work, take care of things at home, or get along with other people?: Not difficult at all  PHQ9 TOTAL SCORE: 5

## 2024-11-27 NOTE — PROGRESS NOTES
Have you had or do you currently have:    - Diabetes? No (gestational diabetes)  - Hypertension? No  - Heart disease, mitral valve prolapse, or rheumatic fever?  No  - An autoimmune disease such as lupus or rheumatoid arthritis?  No  - Kidney disease, urinary tract infection?  Possible UTI  - Epilepsy, seizures, or spells?  No  - Migraine headaches?  No  - Any other neurological problems?  No  - Have you ever been treated for depression?  Yes  - Are you having problems with crying spells or loss of self-esteem?  No  - Have you ever required psychiatric care?  No  - Have you ever had hepatitis, liver disease, or jaundice?  No  - Have you ever been treated for blood clots in your veins, deep vein thrombosis, inflammation in the veins, thrombosis, phelbitis, pulmonary embolism or varicosities?  No  - Have you had excessive bleeding after surgery or dental work?  No  - Do you bleed more than other women after a cut or scratch?  No  - Do you have a history or anemia?  No  - Have you ever had thyroid problems or take thyroid medication?  No  - Do you have any endocrine problems?  No  - Have you every been in a major accident or suffered serious trauma?  No  - Within the last year, has anyone hit, slapped, kicked, or otherwise hurt you?  No  - In the last year, has anyone forced you to have sex when you didn't want to?  No  - Have you every received a blood transfusion?  No  - Would you refuse a blood transfusion if a doctor judged it to be medically necessary?  No  - Does anyone in your home smoke? No  - Do you use tobacco products?  No  - Do you drink beer, wine, or hard liquor?  No  - Do you use any of the following: marijuana, speed, cocaine, heroin, hallucinogens, or other drugs?  No  - Is your blood type RH negative?  No  - Have you ever had asthma?  No  - Have you ever had tuberculosis?  No  - Do you have any allergies to drugs or over-the-counter medications?  No  - Allergies: dust mites, aspartame, ethanol,  venlafaxine hydrochloride, sertraline?  No  - Have you had any breast problems?  No  - Have you ever breast-fed?  Yes  - Have you had any gynecological surgical procedures such as cervical conization, LEEP, laser treatment, cryosurgery of the cervix, or a dilatation and curettage, etc?  No  - Have you ever had any other surgical procedures?  No  - Have you ever had any anesthetic complications?  No  - Have you ever had an abnormal pap smear?  No  - Do you have a history of abnormalities of the uterus? No  - Did your mother take SADIQ or any other hormones when she was pregnant with you?  No  - Did it take you more than one year to become pregnant?  No  - Have you ever been evaluated or treated for infertility?  No  - Is there a history of medical problems in your family, which you feel might adversely affect your health or pregnancy?  No  - Do you have any other problems we have not asked about which you feel may be important to this pregnancy?  History of stillbirth and three miscarriages    Symptoms since last menstrual period  - Do you currently have any of the following symptoms: abdominal pain, blood in the stool or urine, chest pain, shortness of breath, coughing or vomiting up blood, you heart is racing or skipping beats, nausea and vomiting, pain on urination, or vaginal discharge or bleeding?  No    Genetic screening  Has the patient, baby's father, or anyone in either family had:  - Thalassemia (Italian, Greek, Mediterranean, or  background only) and an MCV result less than 80?  No  - Neural tube defect such as meningomyelocele, spina bifida, or anencephaly?  No  - Congential heart defect?  No  - Down's syndrome?  No  - Darnell-Sachs disease ( Mandaen, Cajun, Mohawk-Palestinian)?  No  - Sickle cell disease or trait () ?  No  - Hemophilia or other inherited problems of blood?  No  - Muscular dystrophy?  Pt's nephew is a carrier  - Cystic fibrosis?  No  - [place]anqu[place]'s chorea?  No  - Mental  retardation/autism?  No   If yes, was the person tested for Fragile X?    - Any other inherited genetic or chromosomal disorder?  No  - Maternal metabolic disorder (e.g. insulin- dependent diabetes, PKU)?  Gestational DM  - A child with birth defects not listed above?  No  - Recurrent pregnancy loss, or a stillbirth?  Stillbirth  - Has the patient had any medications/street drugs/alcohol since her last menstrual period?  No  - Does the patient or baby's father have any other genetic risk?  No    Infection history  - Have you ever been treated for tuberculosis?  No  - Have you every had a positive skin test for tuberculosis?  No  - Do you live with someone who has tuberculosis?  No  - Have you ever been exposed to tuberculosis?  No  - Do you have genital herpes?  No  - Does your partner have genital herpes?  No  - Have you had a rash or viral illness since your last period?  No  - Have you ever had gonorrhea, chlamydia, syphilis, venereal warts, trichomoniasis, pelvic inflammatory disease, or any other sexually transmitted disease?  No  - Have you had chicken pox?  Yes during childhood  - Have you been vaccinated against chicken pox?  No  - Have you had any other infectious diseases?  No   Answers submitted by the patient for this visit:  Patient Health Questionnaire (Submitted on 11/27/2024)  If you checked off any problems, how difficult have these problems made it for you to do your work, take care of things at home, or get along with other people?: Not difficult at all  PHQ9 TOTAL SCORE: 5

## 2024-12-03 LAB
BKR LAB AP GYN ADEQUACY: NORMAL
BKR LAB AP GYN INTERPRETATION: NORMAL
BKR LAB AP HPV REFLEX: NORMAL
BKR LAB AP PREVIOUS ABNORMAL: NORMAL
PATH REPORT.COMMENTS IMP SPEC: NORMAL
PATH REPORT.COMMENTS IMP SPEC: NORMAL
PATH REPORT.RELEVANT HX SPEC: NORMAL

## 2024-12-05 ENCOUNTER — PATIENT OUTREACH (OUTPATIENT)
Dept: EDUCATION SERVICES | Facility: HOSPITAL | Age: 29
End: 2024-12-05

## 2024-12-05 DIAGNOSIS — O24.414 INSULIN CONTROLLED GESTATIONAL DIABETES MELLITUS (GDM) IN THIRD TRIMESTER: Primary | ICD-10-CM

## 2024-12-05 RX ORDER — HUMAN INSULIN 100 [IU]/ML
10 INJECTION, SUSPENSION SUBCUTANEOUS AT BEDTIME
Qty: 15 ML | Refills: 1 | Status: SHIPPED | OUTPATIENT
Start: 2024-12-05 | End: 2024-12-06

## 2024-12-05 NOTE — PROGRESS NOTES
Diabetes Self-Management Education & Support    Call to patient, scheduled for 12/6 for GDM visit.   Provider will not be in clinic this day and will need to reschedule. Will plan to reschedule to coincide with her next OB follow up appointment- will work her in if needed.     Call to assess and offer recommendations via phone.   Kelli sharma this is her 3rd pregnancy with GDM, quite familiar. Needed insulin.   Testing FBG mostly- on average now closer to 120s. Shares was  but lately levels are creeping up.     In the past has taken Levemir, worked best. This insulin is no longer made by manufacture.   Lantus- didn't work as well.     Would recommend insulin NPH 10 units at bedtime. Discussed administration, roll gently to mix insulin, it will be cloudy.   Will plan to  adjust every 3-5 days.     Cost for the Dexcom was $1000. Did not . Plans to discuss with provider tomorrow at visit.     Kelli shares she has another appointment and would like to call back if able.   Return number provided to call.   Will send info via [employer] and packet for patient left at provider's desk for appointment tomorrow. (Gestational Diabetes The First Step booklet, insulin administration, hypoglycemia and treat sheet from [employer], writer's contact card/info.)  Will need to reschedule 12/6/24 visit. - Message sent to Janine.     Alexandra Dawn RN [facility]   [phone]  12/5/2024 at 3:21 PM

## 2024-12-06 ENCOUNTER — HOSPITAL ENCOUNTER (OUTPATIENT)
Dept: EDUCATION SERVICES | Facility: HOSPITAL | Age: 29
Discharge: HOME | End: 2024-12-06
Attending: STUDENT IN AN ORGANIZED HEALTH CARE EDUCATION/TRAINING PROGRAM

## 2024-12-06 ENCOUNTER — PRENATAL OFFICE VISIT (OUTPATIENT)
Dept: FAMILY MEDICINE | Facility: OTHER | Age: 29
End: 2024-12-06
Attending: STUDENT IN AN ORGANIZED HEALTH CARE EDUCATION/TRAINING PROGRAM

## 2024-12-06 VITALS
RESPIRATION RATE: 20 BRPM | DIASTOLIC BLOOD PRESSURE: 60 MMHG | TEMPERATURE: 97.8 F | OXYGEN SATURATION: 99 % | HEIGHT: 66 IN | HEART RATE: 84 BPM | SYSTOLIC BLOOD PRESSURE: 104 MMHG | WEIGHT: 207.3 LBS | BODY MASS INDEX: 33.32 KG/M2

## 2024-12-06 DIAGNOSIS — O24.414 INSULIN CONTROLLED GESTATIONAL DIABETES MELLITUS (GDM) IN THIRD TRIMESTER: ICD-10-CM

## 2024-12-06 PROCEDURE — 99207 PR PRENATAL VISIT: CPT | Performed by: STUDENT IN AN ORGANIZED HEALTH CARE EDUCATION/TRAINING PROGRAM

## 2024-12-06 RX ORDER — HUMAN INSULIN 100 [IU]/ML
10 INJECTION, SUSPENSION SUBCUTANEOUS AT BEDTIME
Qty: 15 ML | Refills: 1 | Status: SHIPPED | OUTPATIENT
Start: 2024-12-06

## 2024-12-06 ASSESSMENT — PAIN SCALES - GENERAL: PAINLEVEL_OUTOF10: NO PAIN (0)

## 2024-12-09 ENCOUNTER — PATIENT OUTREACH (OUTPATIENT)
Dept: EDUCATION SERVICES | Facility: HOSPITAL | Age: 29
End: 2024-12-09

## 2024-12-09 NOTE — PROGRESS NOTES
Diabetes Self-Management Education & Support    Follow up NPH insulin start.   Pt feels going well. Spouse is a nurse and has been helpful.     Was able to  insulin on Friday and start that evening. Took the 10 units, Friday, Saturday. Since minimal change- she took 12 units on Sunday.   BG on Saturday: 120  Sunday 116  Monday 103    Made recommendation to go to 14 units at bedtime. (NPH insulin). Will call to review on Thursday 12/11.   Pt  agrees with plan and thanks for the call.   Alexandra Dawn, [facility]AN[facility]   [phone]

## 2024-12-16 ENCOUNTER — PATIENT OUTREACH (OUTPATIENT)
Dept: EDUCATION SERVICES | Facility: HOSPITAL | Age: 29
End: 2024-12-16

## 2024-12-16 NOTE — PROGRESS NOTES
"Diabetes Self-Management Education & Support    Follow up with Kelli to review glucose levels.     Pt states her fasting has been \"stubbornly at 103\"   Has had 1 dose of NPH: 22 units.  Will take 22 units this evening. If still at 103 tomorrow, okay to increase to 26 units.     Pt is scheduled 12/19 for DRC and PCP visit.   Alexandra Dawn RN [facility]   [phone]  12/16/2024 at 4:24 PM        "

## 2024-12-19 ENCOUNTER — OFFICE VISIT (OUTPATIENT)
Dept: FAMILY MEDICINE | Facility: OTHER | Age: 29
End: 2024-12-19
Attending: STUDENT IN AN ORGANIZED HEALTH CARE EDUCATION/TRAINING PROGRAM

## 2024-12-19 ENCOUNTER — HOSPITAL ENCOUNTER (OUTPATIENT)
Dept: EDUCATION SERVICES | Facility: HOSPITAL | Age: 29
Discharge: HOME | End: 2024-12-19
Attending: STUDENT IN AN ORGANIZED HEALTH CARE EDUCATION/TRAINING PROGRAM

## 2024-12-19 VITALS
SYSTOLIC BLOOD PRESSURE: 104 MMHG | BODY MASS INDEX: 33.26 KG/M2 | HEIGHT: 67 IN | WEIGHT: 211.9 LBS | OXYGEN SATURATION: 98 % | HEART RATE: 78 BPM | DIASTOLIC BLOOD PRESSURE: 71 MMHG | RESPIRATION RATE: 16 BRPM

## 2024-12-19 VITALS
WEIGHT: 211 LBS | HEART RATE: 75 BPM | TEMPERATURE: 97.9 F | DIASTOLIC BLOOD PRESSURE: 78 MMHG | HEIGHT: 67 IN | SYSTOLIC BLOOD PRESSURE: 112 MMHG | BODY MASS INDEX: 33.12 KG/M2 | OXYGEN SATURATION: 99 %

## 2024-12-19 DIAGNOSIS — F32.A DEPRESSION, UNSPECIFIED DEPRESSION TYPE: ICD-10-CM

## 2024-12-19 DIAGNOSIS — R35.0 URINARY FREQUENCY: Primary | ICD-10-CM

## 2024-12-19 DIAGNOSIS — O24.414 INSULIN CONTROLLED GESTATIONAL DIABETES MELLITUS (GDM) IN THIRD TRIMESTER: ICD-10-CM

## 2024-12-19 DIAGNOSIS — O09.90 HIGH-RISK PREGNANCY, UNSPECIFIED TRIMESTER: ICD-10-CM

## 2024-12-19 LAB
ALBUMIN UR-MCNC: NEGATIVE MG/DL
APPEARANCE UR: CLEAR
BACTERIA #/AREA URNS HPF: ABNORMAL /HPF
BASOPHILS # BLD AUTO: 0 10E3/UL (ref 0–0.2)
BASOPHILS NFR BLD AUTO: 0 %
BILIRUB UR QL STRIP: NEGATIVE
COLOR UR AUTO: ABNORMAL
EOSINOPHIL # BLD AUTO: 0.1 10E3/UL (ref 0–0.7)
EOSINOPHIL NFR BLD AUTO: 1 %
ERYTHROCYTE [DISTWIDTH] IN BLOOD BY AUTOMATED COUNT: 15 % (ref 10–15)
GLUCOSE UR STRIP-MCNC: NEGATIVE MG/DL
HCT VFR BLD AUTO: 35.3 % (ref 35–47)
HGB BLD-MCNC: 11.5 G/DL (ref 11.7–15.7)
HGB UR QL STRIP: NEGATIVE
IMM GRANULOCYTES # BLD: 0.1 10E3/UL
IMM GRANULOCYTES NFR BLD: 1 %
KETONES UR STRIP-MCNC: NEGATIVE MG/DL
LEUKOCYTE ESTERASE UR QL STRIP: NEGATIVE
LYMPHOCYTES # BLD AUTO: 2.7 10E3/UL (ref 0.8–5.3)
LYMPHOCYTES NFR BLD AUTO: 32 %
MCH RBC QN AUTO: 26.6 PG (ref 26.5–33)
MCHC RBC AUTO-ENTMCNC: 32.6 G/DL (ref 31.5–36.5)
MCV RBC AUTO: 82 FL (ref 78–100)
MONOCYTES # BLD AUTO: 0.5 10E3/UL (ref 0–1.3)
MONOCYTES NFR BLD AUTO: 7 %
MUCOUS THREADS #/AREA URNS LPF: PRESENT /LPF
NEUTROPHILS # BLD AUTO: 5 10E3/UL (ref 1.6–8.3)
NEUTROPHILS NFR BLD AUTO: 60 %
NITRATE UR QL: NEGATIVE
NRBC # BLD AUTO: 0 10E3/UL
NRBC BLD AUTO-RTO: 0 /100
PH UR STRIP: 6.5 [PH] (ref 4.7–8)
PLATELET # BLD AUTO: 127 10E3/UL (ref 150–450)
RBC # BLD AUTO: 4.33 10E6/UL (ref 3.8–5.2)
RBC URINE: <1 /HPF
SP GR UR STRIP: 1 (ref 1–1.03)
SQUAMOUS EPITHELIAL: 0 /HPF
UROBILINOGEN UR STRIP-MCNC: NORMAL MG/DL
WBC # BLD AUTO: 8.3 10E3/UL (ref 4–11)
WBC URINE: <1 /HPF

## 2024-12-19 PROCEDURE — 36415 COLL VENOUS BLD VENIPUNCTURE: CPT | Performed by: STUDENT IN AN ORGANIZED HEALTH CARE EDUCATION/TRAINING PROGRAM

## 2024-12-19 PROCEDURE — 85025 COMPLETE CBC W/AUTO DIFF WBC: CPT | Performed by: STUDENT IN AN ORGANIZED HEALTH CARE EDUCATION/TRAINING PROGRAM

## 2024-12-19 PROCEDURE — G0108 DIAB MANAGE TRN  PER INDIV: HCPCS

## 2024-12-19 PROCEDURE — 82306 VITAMIN D 25 HYDROXY: CPT | Performed by: STUDENT IN AN ORGANIZED HEALTH CARE EDUCATION/TRAINING PROGRAM

## 2024-12-19 PROCEDURE — 99207 PR PRENATAL VISIT: CPT | Performed by: STUDENT IN AN ORGANIZED HEALTH CARE EDUCATION/TRAINING PROGRAM

## 2024-12-19 PROCEDURE — 81001 URINALYSIS AUTO W/SCOPE: CPT | Performed by: STUDENT IN AN ORGANIZED HEALTH CARE EDUCATION/TRAINING PROGRAM

## 2024-12-19 PROCEDURE — 86780 TREPONEMA PALLIDUM: CPT | Performed by: STUDENT IN AN ORGANIZED HEALTH CARE EDUCATION/TRAINING PROGRAM

## 2024-12-19 ASSESSMENT — PAIN SCALES - GENERAL
PAINLEVEL_OUTOF10: NO PAIN (0)
PAINLEVEL_OUTOF10: NO PAIN (0)

## 2024-12-19 NOTE — PATIENT INSTRUCTIONS
Kelli I am so glad you came in to meet with me today and get to meet face to face after all of our phone calls. :)      I will recommend:   Check glucose 4 times daily, before breakfast and 1 hour after each meal. Encourage consistent BG checks- as best as you can. The more data- the better picture we get and can make a plan to help you and your baby :)    Consider testing Ketones (urine dip stix). - I will look in to your questions blood vs urine.     Physical activity recommended. Follow any restrictions your doctor advises.   Light, non-strenuous exercise such as walking. [employer] 30 minutes 5 days of week. Can break time up into shorter increments: 10 minutes three times/day.     Meal plan: 30-45g carbohydrates at breakfast, 30-45g carbohydrates at lunch, 30-45g carbohydrates at supper, 15-30g carbohydrates at 3 snacks a day.  Follow consistent carbohydrate meal plan, eat carbohydrates and protein/fat at all meals/snacks.    INSULIN: NPH- 26 units starting this evening. If glucose is still above 94 over the next 3 days, on Sunday increase to 30 units. I will follow up on Thursday 12/26 (I am out 12/23-25. If you have questions or concerns, my partner Mercedes will be here to help you.     We may consider adding a meal time insulin to help with meal time spikes- this is due to the hormones increasing insulin resistance.     Please call or send a [employer] message if you have any questions or concern! I am here to help you :)  [phone]- this goes to our nurse (I don't have a voicemail if I am not at my desk at the other number I gave you).     Alexandra Dawn, RN CDCES  Diabetes Education

## 2024-12-19 NOTE — PROGRESS NOTES
"Diabetes Self-Management Education & Support    Type of Service: In Person Visit    Assessment  Eklli presents to [facility] today for GDM education and support. Here with her spouse- Andres JACKSON 3/19/2025    Ob visit today- after [facility].   Pt expresses familiar with GDM, 3rd pregnancy with GDM. Toddler at birth was just over 9#.   Has a book \" Real food for gestational diabetes\". Asks about this thoughts of this book. Will look into this book   Spouse is RN/ICU.     Pt is taking NPH insulin at bedtime. Current dose is 24 units, 2 doses at this dose.     Fasting blood glucoses: 14% in target.  After breakfast: undetermined% in target.  After lunch: undetermined % in target.  After dinner: undetermined % in target.  Consistent checking FBG. Inconsistent post meals: range 128-147.    Spouse shares/estimates Kelli has about 30 grams of carbs/meals.     Activity level is low.     Intervention  Patient monitoring glucose levels with insulin therapy/NPH at bedtime. Continue to adjust dose pr glucose levels.   Discussed possibly adding a meal time insulin.   Encouraged to consider adding activity to help manage BG.     Educational topics covered today:   ketone testing and physical activity  Reviewed sharps disposal.     Educational materials provided today:   Provided BG log sheets today.      12/5/2024: DRC RN put together education packet for patient, OB provider gave to patient as last visit on 12/6.   Gestational Diabetes The First Steps. Sharps. My food plan for gestational diabetes.     Plan  Check glucose 4 times daily, before breakfast and 1 hour after each meal. Encourage consistent BG checks.   Consider testing Ketones (urine dip stix).    Physical activity recommended.    Meal plan: 30-45g carbohydrates at breakfast, 30-45g carbohydrates at lunch, 30-45g carbohydrates at supper, 15-30g carbohydrates at 3 snacks a day.  Follow consistent carbohydrate meal plan, eat carbohydrates and protein/fat at all meals/snacks.    NPH- 26 " units starting this evening. If glucose is still above 94 over the next 3 days, on Sunday increase to 30 units. DRC RN will follow up on Thursday.   If has concerns or questions, will call or send a mychart sooner for covering RDKRYSTA.     Send in Glucose Log (blood sugars) via Proximagenhart in 7 days. If 3 or more blood sugars are above the goal at a given time, or if Ketones are moderate or large, call or MyChart message the diabetes educator.    Call/MyChart message diabetes educator if 3 or more blood sugars are above the goal in 1 week, if ketones are positive, or with questions/concerns.    Follow-up:    Follow up on Upcoming Diabetes Ed Appointments     Visit Type Date Time Department    DIABETES ED 12/19/2024  1:00 PM [facility] DIABETES ED        Subjective/Objective  Kelli is an 29 year old year old, presenting for the following diabetes education related to:      Cultural Influences/Ethnic Background:  Not  or     Estimated Date of Delivery: Data Unavailable    1 hour OGTT  Lab Results   Component Value Date    GLU1 193 (H) 12/08/2022         3 hour OGTT    Fasting  Lab Results   Component Value Date    GTTGF 141 (H) 12/23/2022       1 hour  Lab Results   Component Value Date    GTTG1 247 (H) 12/23/2022       2 hour  Lab Results   Component Value Date    GTTG2 248 (H) 12/23/2022       3 hour  Lab Results   Component Value Date    GTTG3 172 (H) 12/23/2022       Alexandra Dawn RN [facility]   [phone]  Time Spent: 30 minutes  Encounter Type: Individual     Any diabetes medication dose changes were made via the [facility] Standing Orders under the patient's referring provider.

## 2024-12-20 LAB — T PALLIDUM AB SER QL: NONREACTIVE

## 2024-12-21 LAB — VIT D+METAB SERPL-MCNC: 36 NG/ML (ref 20–50)

## 2024-12-26 ENCOUNTER — PATIENT OUTREACH (OUTPATIENT)
Dept: EDUCATION SERVICES | Facility: HOSPITAL | Age: 29
End: 2024-12-26

## 2024-12-26 NOTE — PROGRESS NOTES
Pt responded via [employer].   Alexandra Dawn RN [facility]   [phone]  12/26/2024 at 3:37 PM

## 2024-12-26 NOTE — PROGRESS NOTES
Diabetes Self-Management Education & Support    Follow up with Kelli to review glucose levels.  No answer, left message with return call information.   Vindicia message sent.     lAexandra Dawn RN [facility]   [phone]  12/26/2024 at 1:25 PM

## 2024-12-31 ENCOUNTER — PATIENT OUTREACH (OUTPATIENT)
Dept: EDUCATION SERVICES | Facility: HOSPITAL | Age: 29
End: 2024-12-31

## 2024-12-31 NOTE — PROGRESS NOTES
Diabetes Self-Management Education & Support    Follow up to review glucose levels, insulin.     Pt shares her glucose was 96 this morning.   103   104  91  92    Last night- took 32 units of NPH. Was taking 30 units.    Kelli shares had a lot going on, [place] and got home late last night.   Will recommend increasing to 34 units and follow up on Friday. Pt has OB visit on Friday.   Kelli agrees with plan. No questions at this time.     Alexandra Dawn RN [facility]   [phone]  12/31/2024 at 3:53 PM

## 2025-01-03 ENCOUNTER — PATIENT OUTREACH (OUTPATIENT)
Dept: EDUCATION SERVICES | Facility: HOSPITAL | Age: 30
End: 2025-01-03

## 2025-01-03 ENCOUNTER — PRENATAL OFFICE VISIT (OUTPATIENT)
Dept: FAMILY MEDICINE | Facility: OTHER | Age: 30
End: 2025-01-03
Attending: STUDENT IN AN ORGANIZED HEALTH CARE EDUCATION/TRAINING PROGRAM

## 2025-01-03 VITALS
OXYGEN SATURATION: 99 % | TEMPERATURE: 97.2 F | DIASTOLIC BLOOD PRESSURE: 60 MMHG | HEART RATE: 74 BPM | SYSTOLIC BLOOD PRESSURE: 104 MMHG | RESPIRATION RATE: 16 BRPM | HEIGHT: 66 IN | BODY MASS INDEX: 33.8 KG/M2 | WEIGHT: 210.3 LBS

## 2025-01-03 DIAGNOSIS — L91.8 CUTANEOUS TAG: ICD-10-CM

## 2025-01-03 DIAGNOSIS — O24.419 GESTATIONAL DIABETES MELLITUS (GDM) IN THIRD TRIMESTER, GESTATIONAL DIABETES METHOD OF CONTROL UNSPECIFIED: Primary | ICD-10-CM

## 2025-01-03 ASSESSMENT — PAIN SCALES - GENERAL: PAINLEVEL_OUTOF10: NO PAIN (0)

## 2025-01-03 NOTE — PROGRESS NOTES
Kelli presents to clinic for routine OB.   She has GDM A2.  She is currently on insulin at 34 units of NPH daily.  Fasting BG not optimized. Some days she is at target and other days she is still above target but doing better  She is followed closely by Alexandra Dawn in diabetes education  She is at 29w3d today  Feeling well overall  She endorses good fetal movements  She denies any VB, LOF, cramping, abdominal pain   This morning fasting was 89  Yesterday morning fasting was 112  34 units of NPH   Kelli [place] me [place].  Very busy, didn't eat the best.  Has been following with Alexandra in Diabetes education    Skin tag on outer eyelid that has been growing.  Patient wants this removed today.

## 2025-01-03 NOTE — PROGRESS NOTES
Diabetes Self-Management Education & Support    Kelli reports following FB  99  112  89    Will continue NPH 34 units this evening. If tomorrow/Saturday is >94, will increase dose to 38 units. If <94 will continue with 34 units. .     Follow up  via phone/mychart to review glucose levels.   Alexandra Dawn, RN [facility]   [phone]

## 2025-01-07 ENCOUNTER — PATIENT OUTREACH (OUTPATIENT)
Dept: EDUCATION SERVICES | Facility: HOSPITAL | Age: 30
End: 2025-01-07

## 2025-01-07 NOTE — PROGRESS NOTES
Diabetes Self-Management Education & Support    Follow up to review glucose levels.   Call went to [employer]. Message left letting Kelli know I will try calling her tomorrow or if she would prefer, she can send her readings via [employer].     Alexandra Dawn RN [facility]   [phone]  1/7/2025 at 4:27 PM

## 2025-01-08 NOTE — PROGRESS NOTES
Left message for return call.   DRC RN will try calling tomorrow 1/9.    Alexandra Dawn RN [facility]   [phone]  1/8/2025 at 4:28 PM

## 2025-01-09 NOTE — PROGRESS NOTES
Diabetes Self-Management Education & Support    Left message requesting return call.   Alexandra Dawn, RN [facility]   [phone]  1/9/2025 at 1:27 PM

## 2025-01-20 ENCOUNTER — DOCUMENTATION ONLY (OUTPATIENT)
Dept: EDUCATION SERVICES | Facility: HOSPITAL | Age: 30
End: 2025-01-20

## 2025-01-20 NOTE — PROGRESS NOTES
Hi Alexandra, thanks for letting me know.  I will try to reach out as well.  I hope you are doing well and thanks for all that you do

## 2025-01-20 NOTE — PROGRESS NOTES
Update OB provider:  Several messages left at preferred number and [employer] message sent which appear to have been read without response requesting to review glucose levels and NPH insulin adjust as indicated.     Phone messages left: 1/7/25, 1/8/25, 1/9/25.   [employer] message sent 1/16/25 at 3:59PM:  [email or web address] view: Last read by Kelli Harper at  4:34 PM on 1/16/2025.     DRC last communication was via phone on 1/3/25 with plan to follow up on 1/7 to review glucose levels.     Pt is scheduled with her OB provider 1/29/2025.     Alexandra Dawn RN [facility]   [phone]  1/20/2025 at 11:51 AM

## 2025-01-29 ENCOUNTER — PRENATAL OFFICE VISIT (OUTPATIENT)
Dept: FAMILY MEDICINE | Facility: OTHER | Age: 30
End: 2025-01-29
Attending: STUDENT IN AN ORGANIZED HEALTH CARE EDUCATION/TRAINING PROGRAM

## 2025-01-29 VITALS
RESPIRATION RATE: 16 BRPM | TEMPERATURE: 98.6 F | OXYGEN SATURATION: 98 % | BODY MASS INDEX: 34.14 KG/M2 | HEART RATE: 85 BPM | WEIGHT: 211.5 LBS | DIASTOLIC BLOOD PRESSURE: 72 MMHG | SYSTOLIC BLOOD PRESSURE: 112 MMHG

## 2025-01-29 DIAGNOSIS — O24.414 INSULIN CONTROLLED GESTATIONAL DIABETES MELLITUS (GDM) IN THIRD TRIMESTER: ICD-10-CM

## 2025-01-29 RX ORDER — ACYCLOVIR 400 MG/1
TABLET ORAL
Qty: 3 EACH | Refills: 5 | Status: SHIPPED | OUTPATIENT
Start: 2025-01-29

## 2025-01-29 RX ORDER — ACYCLOVIR 400 MG/1
TABLET ORAL
Qty: 1 EACH | Refills: 0 | Status: SHIPPED | OUTPATIENT
Start: 2025-01-29

## 2025-01-29 RX ORDER — HUMAN INSULIN 100 [IU]/ML
40 INJECTION, SUSPENSION SUBCUTANEOUS AT BEDTIME
Qty: 15 ML | Refills: 1 | Status: SHIPPED | OUTPATIENT
Start: 2025-01-29

## 2025-01-29 ASSESSMENT — PAIN SCALES - GENERAL: PAINLEVEL_OUTOF10: NO PAIN (0)

## 2025-01-29 NOTE — PROGRESS NOTES
Feeling well  Pelvic floor therapy has been helpful- Does it in GR  Lost baby at 37w with second pregnancy  Birth was so fast with- 2 stitches.  Baby is moving well, no VB, no LOF.    40 units  Fasting- <95 consistently  NST and US- Measuring well0 34 weeks and fetal heart tones reassuring.

## 2025-02-04 ENCOUNTER — PATIENT OUTREACH (OUTPATIENT)
Dept: EDUCATION SERVICES | Facility: HOSPITAL | Age: 30
End: 2025-02-04

## 2025-02-04 NOTE — PROGRESS NOTES
Diabetes Self-Management Education & Support    Message left for return call.     Alexandra Dawn RN [facility]   [phone]  2/4/2025 at 4:20 PM    Kelli returned call.  Discussed glucose levels and variance with BG meter.   CGM target range in pregnancy- is able to adjust in conrad. If unable to change- recommend calling dexcom to help walk through this adjustment- important as it will help align with her targets and she will know her levels.   So far, is happy with the CGM and being able to see the spikes that she wouldn't otherwise be aware of.   Will continue to support Kelli.   Alexandra Dawn RN [facility]   [phone]  2/4/2025 at 4:32 PM

## 2025-02-12 ENCOUNTER — HOSPITAL ENCOUNTER (OUTPATIENT)
Facility: HOSPITAL | Age: 30
Discharge: HOME | End: 2025-02-12
Attending: STUDENT IN AN ORGANIZED HEALTH CARE EDUCATION/TRAINING PROGRAM | Admitting: STUDENT IN AN ORGANIZED HEALTH CARE EDUCATION/TRAINING PROGRAM

## 2025-02-12 ENCOUNTER — PRENATAL OFFICE VISIT (OUTPATIENT)
Dept: FAMILY MEDICINE | Facility: OTHER | Age: 30
End: 2025-02-12
Attending: STUDENT IN AN ORGANIZED HEALTH CARE EDUCATION/TRAINING PROGRAM

## 2025-02-12 ENCOUNTER — PATIENT OUTREACH (OUTPATIENT)
Dept: EDUCATION SERVICES | Facility: HOSPITAL | Age: 30
End: 2025-02-12

## 2025-02-12 VITALS
RESPIRATION RATE: 16 BRPM | DIASTOLIC BLOOD PRESSURE: 68 MMHG | TEMPERATURE: 97.7 F | OXYGEN SATURATION: 99 % | HEART RATE: 80 BPM | BODY MASS INDEX: 31.58 KG/M2 | SYSTOLIC BLOOD PRESSURE: 118 MMHG | WEIGHT: 208.4 LBS | HEIGHT: 68 IN

## 2025-02-12 VITALS
HEART RATE: 80 BPM | TEMPERATURE: 97.5 F | HEIGHT: 66 IN | DIASTOLIC BLOOD PRESSURE: 66 MMHG | OXYGEN SATURATION: 98 % | WEIGHT: 211 LBS | RESPIRATION RATE: 14 BRPM | SYSTOLIC BLOOD PRESSURE: 119 MMHG | BODY MASS INDEX: 33.91 KG/M2

## 2025-02-12 DIAGNOSIS — O24.414 INSULIN CONTROLLED GESTATIONAL DIABETES MELLITUS (GDM) IN THIRD TRIMESTER: Primary | ICD-10-CM

## 2025-02-12 PROCEDURE — G0463 HOSPITAL OUTPT CLINIC VISIT: HCPCS

## 2025-02-12 PROCEDURE — 59025 FETAL NON-STRESS TEST: CPT

## 2025-02-12 ASSESSMENT — ACTIVITIES OF DAILY LIVING (ADL)
ADLS_ACUITY_SCORE: 41
ADLS_ACUITY_SCORE: 41

## 2025-02-12 ASSESSMENT — PAIN SCALES - GENERAL: PAINLEVEL_OUTOF10: NO PAIN (0)

## 2025-02-12 NOTE — DISCHARGE INSTRUCTIONS
Learning About When to Call Your Doctor During Pregnancy (After 20 Weeks)  Overview  It's common to have concerns about what might be a problem when you're pregnant. Most pregnancies don't have any serious problems. But it's still important to know when to call your doctor if you have certain symptoms or signs of labor.  These are general suggestions. Your doctor may give you some more information about when to call.  When to call your doctor (after 20 weeks)  Call 911  anytime you think you may need emergency care. For example, call if:  You have severe vaginal bleeding. This means you are soaking through a pad each hour for 2 or more hours.  You have sudden, severe pain in your belly.  You have chest pain, are short of breath, or cough up blood.  You passed out (lost consciousness).  You have a seizure.  You see or feel the umbilical cord.  You think you are about to deliver your baby and can't make it safely to the hospital or birthing center.  Call your doctor now or seek immediate medical care if:  You have vaginal bleeding.  You have belly pain.  You have a fever.  You are dizzy or lightheaded, or you feel like you may faint.  You have signs of a blood clot in your leg (called a deep vein thrombosis), such as:  Pain in the calf, back of the knee, thigh, or groin.  Swelling in your leg or groin.  A color change on the leg or groin. The skin may be reddish or purplish, depending on your usual skin color.  You have symptoms of preeclampsia, such as:  Sudden swelling of your face, hands, or feet.  New vision problems (such as dimness, blurring, or seeing spots).  A severe headache.  You have a sudden release of fluid from your vagina. (You think your water broke.)  You've been having regular contractions for an hour. This means that you've had at least 6 contractions within 1 hour, even after you change your position and drink fluids.  You notice that your baby has stopped moving or is moving less than  "normal.  You have signs of heart failure, such as:  New or increased shortness of breath.  New or worse swelling in your legs, ankles, or feet.  Sudden weight gain, such as more than 2 to 3 pounds in a day or 5 pounds in a week.  Feeling so tired or weak that you cannot do your usual activities.  You have symptoms of a urinary tract infection. These may include:  Pain or burning when you urinate.  A frequent need to urinate without being able to pass much urine.  Pain in the flank, which is just below the rib cage and above the waist on either side of the back.  Blood in your urine.  Watch closely for changes in your health, and be sure to contact your doctor if:  You have vaginal discharge that smells bad.  You feel sad, anxious, or hopeless for more than a few days.  You have skin changes, such as a rash, itching, or a yellow color to your skin.  You have other concerns about your pregnancy.  If you have labor signs at 37 weeks or more  If you have signs of labor at 37 weeks or more, your doctor may tell you to call when your labor becomes more active. Symptoms of active labor include:  Contractions that are regular.  Contractions that are less than 5 minutes apart.  Contractions that are hard to talk through.  Follow-up care is a key part of your treatment and safety. Be sure to make and go to all appointments, and call your doctor if you are having problems. It's also a good idea to know your test results and keep a list of the medicines you take.  Where can you learn more?  Go to https://www.CommercialTribe.net/patiented  Enter N531 in the search box to learn more about \"Learning About When to Call Your Doctor During Pregnancy (After 20 Weeks).\"  Current as of: April 30, 2024  Content Version: 14.3    2024 [employer] [employer].   Care instructions adapted under license by your healthcare professional. If you have questions about a medical condition or this instruction, always ask your healthcare professional. " [email or web address], [facility] disclaims any warranty or liability for your use of this information.

## 2025-02-12 NOTE — TELEPHONE ENCOUNTER
Diabetes Self-Management Education & Support    Review of CGM Dexcom G7 report:   Printed copy for OB provide and for patient.     Kelli does have alerts set for  <65 and >140 as her target range is .    -----------------------------  Dexcom Clarity  -----------------------------  Kelli Meredith    YOB: 1995    Generated at: Wed, Feb 12, 2025 3:32 PM CST    Reporting period: Thu Jan 30, 2025 - Wed Feb 12, 2025  -----------------------------  Glucose Details    Average glucose: 105 mg/dL    GMI: 5.8%    Standard deviation: 18 mg/dL    Coefficient of Variation: 17.4%  -----------------------------  Time in Range    Very High: 0%    High: 0%    In Range: 99%    Low: 1%    Very Low: <1%    Target Range   mg/dL  -----------------------------  Sensor usage    Days with data: 12/14    Time active: 90%    Avg. calibrations per day: 0.0      Message sent to Kleli.   Pt is scheduled weekly, will run report every week or sooner with concerns.     Alexandra Dawn RN [facility]   [phone]  2/12/2025 at 3:39 PM

## 2025-02-12 NOTE — PLAN OF CARE
Miladis Harper        NST:  reactive  Start: 1355  Stop: 1445    Physician: Dr. Sanon  Reason For Test: GDM on insulin  Estimated Date of Delivery: Mar 18, 2025   Gestational Age: 35w1d     Verified with second RN: Elaine Metcalf RN    Comments:Pt here for weekly NST for GDM on insulin. Educated on test and placed on monitor. VSS. NST reactive and verified with second RN. Dr. Sanon  phoned and updated on reactive NST. Order to discharge home given. AVS reviewed and states understanding. Pt discharge home with SO.       Pennie Arce, RN

## 2025-02-18 DIAGNOSIS — O24.414 INSULIN CONTROLLED GESTATIONAL DIABETES MELLITUS (GDM) IN THIRD TRIMESTER: Primary | ICD-10-CM

## 2025-02-19 ENCOUNTER — PATIENT OUTREACH (OUTPATIENT)
Dept: EDUCATION SERVICES | Facility: HOSPITAL | Age: 30
End: 2025-02-19

## 2025-02-19 ENCOUNTER — PRENATAL OFFICE VISIT (OUTPATIENT)
Dept: FAMILY MEDICINE | Facility: OTHER | Age: 30
End: 2025-02-19
Attending: STUDENT IN AN ORGANIZED HEALTH CARE EDUCATION/TRAINING PROGRAM

## 2025-02-19 ENCOUNTER — HOSPITAL ENCOUNTER (OUTPATIENT)
Facility: HOSPITAL | Age: 30
End: 2025-02-19
Admitting: STUDENT IN AN ORGANIZED HEALTH CARE EDUCATION/TRAINING PROGRAM

## 2025-02-19 ENCOUNTER — HOSPITAL ENCOUNTER (OUTPATIENT)
Facility: HOSPITAL | Age: 30
Discharge: HOME | End: 2025-02-19
Attending: STUDENT IN AN ORGANIZED HEALTH CARE EDUCATION/TRAINING PROGRAM | Admitting: STUDENT IN AN ORGANIZED HEALTH CARE EDUCATION/TRAINING PROGRAM

## 2025-02-19 VITALS
RESPIRATION RATE: 18 BRPM | OXYGEN SATURATION: 100 % | DIASTOLIC BLOOD PRESSURE: 72 MMHG | TEMPERATURE: 98.2 F | WEIGHT: 210 LBS | SYSTOLIC BLOOD PRESSURE: 124 MMHG | BODY MASS INDEX: 33.75 KG/M2 | HEIGHT: 66 IN

## 2025-02-19 VITALS
WEIGHT: 211.2 LBS | TEMPERATURE: 98.5 F | OXYGEN SATURATION: 98 % | DIASTOLIC BLOOD PRESSURE: 68 MMHG | SYSTOLIC BLOOD PRESSURE: 98 MMHG | HEART RATE: 78 BPM | BODY MASS INDEX: 33.94 KG/M2 | HEIGHT: 66 IN

## 2025-02-19 DIAGNOSIS — O09.90 HIGH-RISK PREGNANCY, UNSPECIFIED TRIMESTER: Primary | ICD-10-CM

## 2025-02-19 DIAGNOSIS — O24.414 INSULIN CONTROLLED GESTATIONAL DIABETES MELLITUS (GDM) IN THIRD TRIMESTER: ICD-10-CM

## 2025-02-19 PROCEDURE — 59025 FETAL NON-STRESS TEST: CPT

## 2025-02-19 PROCEDURE — 87653 STREP B DNA AMP PROBE: CPT | Performed by: STUDENT IN AN ORGANIZED HEALTH CARE EDUCATION/TRAINING PROGRAM

## 2025-02-19 PROCEDURE — G0463 HOSPITAL OUTPT CLINIC VISIT: HCPCS | Mod: 25

## 2025-02-19 ASSESSMENT — ACTIVITIES OF DAILY LIVING (ADL): ADLS_ACUITY_SCORE: 41

## 2025-02-19 ASSESSMENT — PAIN SCALES - GENERAL: PAINLEVEL_OUTOF10: NO PAIN (0)

## 2025-02-19 NOTE — PLAN OF CARE
Fetal Non-Stress Test Results    NST Ordered By: Dr. Sanon  NST Medical Indication: GDM on Insulin    NST Start & Stop Times  NST Start Time: 1404  NST Stop Time: 1435      NST Results  Fetus A   Baseline Rate: 140  Accelerations: Present  Decelerations: None  Interpretation: reactive  Goal Outcome Evaluation: Dr. Sanon notified of reactive NST. Pt. Did not go to BPP prior and did not know when it was. Ultrasound number given to patient to call to schedule BPP. Pt. Is going to to her appointment now. Discharge instructions reviewed with patient per AVS. Pt. Verbalized understanding of all discharge instructions. Discharged to home.

## 2025-02-19 NOTE — PROGRESS NOTES
Diabetes Self-Management Education & Support      Kelli is scheduled with Dr Sanon today. G7 report printed for provider and copy for Kelli.     -----------------------------  Dexcom Clarity  -----------------------------  Kelli Meredith    YOB: 1995    Generated at: Wed, Feb 19, 2025 2:56 PM CST    Reporting period: Thu Feb 6, 2025 - Wed Feb 19, 2025  -----------------------------  Glucose Details    Average glucose: 112 mg/dL    GMI: 6.0%    Standard deviation: 20 mg/dL    Coefficient of Variation: 17.6%  -----------------------------  Time in Range    Very High: 0%    High: 1%    In Range: 99%    Low: 0%    Very Low: 0%    Target Range   mg/dL    -----------------------------  Sensor usage    Days with data: 14/14    Time active: 97%    Avg. calibrations per day: 0.0

## 2025-02-19 NOTE — DISCHARGE INSTRUCTIONS
Please call ultrasound to schedule your BPP.     Learning About When to Call Your Doctor During Pregnancy (After 20 Weeks)  Overview  It's common to have concerns about what might be a problem when you're pregnant. Most pregnancies don't have any serious problems. But it's still important to know when to call your doctor if you have certain symptoms or signs of labor.  These are general suggestions. Your doctor may give you some more information about when to call.  When to call your doctor (after 20 weeks)  Call 911  anytime you think you may need emergency care. For example, call if:  You have severe vaginal bleeding. This means you are soaking through a pad each hour for 2 or more hours.  You have sudden, severe pain in your belly.  You have chest pain, are short of breath, or cough up blood.  You passed out (lost consciousness).  You have a seizure.  You see or feel the umbilical cord.  You think you are about to deliver your baby and can't make it safely to the hospital or birthing center.  Call your doctor now or seek immediate medical care if:  You have vaginal bleeding.  You have belly pain.  You have a fever.  You are dizzy or lightheaded, or you feel like you may faint.  You have signs of a blood clot in your leg (called a deep vein thrombosis), such as:  Pain in the calf, back of the knee, thigh, or groin.  Swelling in your leg or groin.  A color change on the leg or groin. The skin may be reddish or purplish, depending on your usual skin color.  You have symptoms of preeclampsia, such as:  Sudden swelling of your face, hands, or feet.  New vision problems (such as dimness, blurring, or seeing spots).  A severe headache.  You have a sudden release of fluid from your vagina. (You think your water broke.)  You've been having regular contractions for an hour. This means that you've had at least 6 contractions within 1 hour, even after you change your position and drink fluids.  You notice that your baby has  "stopped moving or is moving less than normal.  You have signs of heart failure, such as:  New or increased shortness of breath.  New or worse swelling in your legs, ankles, or feet.  Sudden weight gain, such as more than 2 to 3 pounds in a day or 5 pounds in a week.  Feeling so tired or weak that you cannot do your usual activities.  You have symptoms of a urinary tract infection. These may include:  Pain or burning when you urinate.  A frequent need to urinate without being able to pass much urine.  Pain in the flank, which is just below the rib cage and above the waist on either side of the back.  Blood in your urine.  Watch closely for changes in your health, and be sure to contact your doctor if:  You have vaginal discharge that smells bad.  You feel sad, anxious, or hopeless for more than a few days.  You have skin changes, such as a rash, itching, or a yellow color to your skin.  You have other concerns about your pregnancy.  If you have labor signs at 37 weeks or more  If you have signs of labor at 37 weeks or more, your doctor may tell you to call when your labor becomes more active. Symptoms of active labor include:  Contractions that are regular.  Contractions that are less than 5 minutes apart.  Contractions that are hard to talk through.  Follow-up care is a key part of your treatment and safety. Be sure to make and go to all appointments, and call your doctor if you are having problems. It's also a good idea to know your test results and keep a list of the medicines you take.  Where can you learn more?  Go to https://www.[employer].net/patiented  Enter N531 in the search box to learn more about \"Learning About When to Call Your Doctor During Pregnancy (After 20 Weeks).\"  Current as of: April 30, 2024  Content Version: 14.3    2024 [facility] [employer].   Care instructions adapted under license by your healthcare professional. If you have questions about a medical condition or this instruction, always " ask your healthcare professional. [employer] disclaims any warranty or liability for your use of this information.

## 2025-02-20 LAB — GP B STREP DNA SPEC QL NAA+PROBE: NEGATIVE

## 2025-02-21 ENCOUNTER — HOSPITAL ENCOUNTER (OUTPATIENT)
Dept: ULTRASOUND IMAGING | Facility: OTHER | Age: 30
Discharge: HOME | End: 2025-02-21
Attending: STUDENT IN AN ORGANIZED HEALTH CARE EDUCATION/TRAINING PROGRAM | Admitting: STUDENT IN AN ORGANIZED HEALTH CARE EDUCATION/TRAINING PROGRAM

## 2025-02-21 DIAGNOSIS — O24.414 INSULIN CONTROLLED GESTATIONAL DIABETES MELLITUS (GDM) IN THIRD TRIMESTER: ICD-10-CM

## 2025-02-21 PROCEDURE — 76816 OB US FOLLOW-UP PER FETUS: CPT

## 2025-02-21 PROCEDURE — 76819 FETAL BIOPHYS PROFIL W/O NST: CPT

## 2025-02-21 NOTE — PROGRESS NOTES
Miladis pratt presents to clinic today for routine OB visit  She is GDM A2, previously uncontrolled.  She has been working very closely with Alexandra in diabetes education to get get her glucose under better control.  She is currently at 40U insulin QHS  Most of her fasting glucose is <95.  Post prandial 1 HR <140 and 2 Hr <120  She is following a diabetic diet  She is doing well overall  She endorses good fetal movements  She denies any VB, LOF, contractions, abdominal or low back pain  Education done  GBS at next visit  Will be starting weekly NST and obtain BPP with growth as well.  Clinically, growth seems to be appropriate as patient as fundal height is concordant with GA  Education done  Follow-up weekly.   All questions answered to patient's and spouse's satisfaction    -Tamiko

## 2025-02-28 NOTE — PROGRESS NOTES
Kelli presents to clinic for her routine OB.  She is at 36w1  She is accompanied by her  Andres who works as an ICU nurse at [employer]  GDM A2, relatively well controlled.  Most fasting BG <95.  Most PP <140 (1 hr) and <120 @ 2 hr which is great.  A few low BG overnight necessitating cutting back on dose from 40 U to 20 or 30 units.    Has had some highs on a few occassions during [place] before arrival of baby  Follows with Alexandra.  Frustrated that CGM cannot be tailed to GDM  with TG glucose range of 140 or better, rather upper range is 180.  She has tried contacting tech support to troubleshoot this.    Endorsing good fetal movements.  Denies VB, LOF, contractions, cramping, pelvic pain or LBP.  Denies HA, vision changes, RUQ pain, edema  GBS negative  Cervical check not done today  Recommend weekly NST with BPP  Recommend IOL no later than 39 weeks based on estimated fetal weight of 3593  Follow-up with Dr. RAO on   Education done,  labor precautions reviewed.

## 2025-03-07 ENCOUNTER — HOSPITAL ENCOUNTER (OUTPATIENT)
Facility: HOSPITAL | Age: 30
Discharge: HOME | End: 2025-03-07
Attending: STUDENT IN AN ORGANIZED HEALTH CARE EDUCATION/TRAINING PROGRAM | Admitting: STUDENT IN AN ORGANIZED HEALTH CARE EDUCATION/TRAINING PROGRAM

## 2025-03-07 ENCOUNTER — PRENATAL OFFICE VISIT (OUTPATIENT)
Dept: FAMILY MEDICINE | Facility: OTHER | Age: 30
End: 2025-03-07
Attending: STUDENT IN AN ORGANIZED HEALTH CARE EDUCATION/TRAINING PROGRAM

## 2025-03-07 VITALS
SYSTOLIC BLOOD PRESSURE: 118 MMHG | OXYGEN SATURATION: 99 % | DIASTOLIC BLOOD PRESSURE: 80 MMHG | RESPIRATION RATE: 18 BRPM | HEART RATE: 84 BPM | TEMPERATURE: 98.8 F

## 2025-03-07 VITALS
BODY MASS INDEX: 33.91 KG/M2 | HEART RATE: 76 BPM | HEIGHT: 66 IN | WEIGHT: 211 LBS | DIASTOLIC BLOOD PRESSURE: 82 MMHG | TEMPERATURE: 97.8 F | OXYGEN SATURATION: 98 % | SYSTOLIC BLOOD PRESSURE: 112 MMHG

## 2025-03-07 DIAGNOSIS — O09.93 SUPERVISION OF HIGH RISK PREGNANCY IN THIRD TRIMESTER: Primary | ICD-10-CM

## 2025-03-07 DIAGNOSIS — O24.414 INSULIN CONTROLLED GESTATIONAL DIABETES MELLITUS (GDM) IN THIRD TRIMESTER: ICD-10-CM

## 2025-03-07 PROCEDURE — 59025 FETAL NON-STRESS TEST: CPT

## 2025-03-07 PROCEDURE — 99207 PR PRENATAL VISIT: CPT | Performed by: STUDENT IN AN ORGANIZED HEALTH CARE EDUCATION/TRAINING PROGRAM

## 2025-03-07 ASSESSMENT — ACTIVITIES OF DAILY LIVING (ADL): ADLS_ACUITY_SCORE: 41

## 2025-03-07 ASSESSMENT — PAIN SCALES - GENERAL: PAINLEVEL_OUTOF10: NO PAIN (0)

## 2025-03-07 NOTE — DISCHARGE INSTRUCTIONS
Learning About When to Call Your Doctor During Pregnancy (After 20 Weeks)  Overview  It's common to have concerns about what might be a problem when you're pregnant. Most pregnancies don't have any serious problems. But it's still important to know when to call your doctor if you have certain symptoms or signs of labor.  These are general suggestions. Your doctor may give you some more information about when to call.  When to call your doctor (after 20 weeks)  Call 911  anytime you think you may need emergency care. For example, call if:  You have severe vaginal bleeding. This means you are soaking through a pad each hour for 2 or more hours.  You have sudden, severe pain in your belly.  You have chest pain, are short of breath, or cough up blood.  You passed out (lost consciousness).  You have a seizure.  You see or feel the umbilical cord.  You think you are about to deliver your baby and can't make it safely to the hospital or birthing center.  Call your doctor now or seek immediate medical care if:  You have vaginal bleeding.  You have belly pain.  You have a fever.  You are dizzy or lightheaded, or you feel like you may faint.  You have signs of a blood clot in your leg (called a deep vein thrombosis), such as:  Pain in the calf, back of the knee, thigh, or groin.  Swelling in your leg or groin.  A color change on the leg or groin. The skin may be reddish or purplish, depending on your usual skin color.  You have symptoms of preeclampsia, such as:  Sudden swelling of your face, hands, or feet.  New vision problems (such as dimness, blurring, or seeing spots).  A severe headache.  You have a sudden release of fluid from your vagina. (You think your water broke.)  You've been having regular contractions for an hour. This means that you've had at least 6 contractions within 1 hour, even after you change your position and drink fluids.  You notice that your baby has stopped moving or is moving less than  "normal.  You have signs of heart failure, such as:  New or increased shortness of breath.  New or worse swelling in your legs, ankles, or feet.  Sudden weight gain, such as more than 2 to 3 pounds in a day or 5 pounds in a week.  Feeling so tired or weak that you cannot do your usual activities.  You have symptoms of a urinary tract infection. These may include:  Pain or burning when you urinate.  A frequent need to urinate without being able to pass much urine.  Pain in the flank, which is just below the rib cage and above the waist on either side of the back.  Blood in your urine.  Watch closely for changes in your health, and be sure to contact your doctor if:  You have vaginal discharge that smells bad.  You feel sad, anxious, or hopeless for more than a few days.  You have skin changes, such as a rash, itching, or a yellow color to your skin.  You have other concerns about your pregnancy.  If you have labor signs at 37 weeks or more  If you have signs of labor at 37 weeks or more, your doctor may tell you to call when your labor becomes more active. Symptoms of active labor include:  Contractions that are regular.  Contractions that are less than 5 minutes apart.  Contractions that are hard to talk through.  Follow-up care is a key part of your treatment and safety. Be sure to make and go to all appointments, and call your doctor if you are having problems. It's also a good idea to know your test results and keep a list of the medicines you take.  Where can you learn more?  Go to https://www.[employer].net/patiented  Enter N531 in the search box to learn more about \"Learning About When to Call Your Doctor During Pregnancy (After 20 Weeks).\"  Current as of: April 30, 2024  Content Version: 14.3    2024 [facility] [employer].   Care instructions adapted under license by your healthcare professional. If you have questions about a medical condition or this instruction, always ask your healthcare professional. " [employer] [facility] disclaims any warranty or liability for your use of this information.

## 2025-03-07 NOTE — PLAN OF CARE
Fetal Non-Stress Test Results    NST Ordered By: Dr. Sanon  NST Medical Indication: GDM on insulin    NST Start & Stop Times  1615 start  1635 stop     NST Results  Fetus A   Baseline Rate: 150  Accelerations: Present  Decelerations: Variable x1  Interpretation: reactive      Dr. Sanon notified of reactive NST. Verified with 2nd RN Darleen. Orders to discharge home obtained. Discharge education provided, patient denied questions. Patient educated that someone from the OB floor will be contacting her the night before her scheduled induction for information and arrival time.

## 2025-03-13 ENCOUNTER — HOSPITAL ENCOUNTER (INPATIENT)
Facility: HOSPITAL | Age: 30
End: 2025-03-13
Attending: STUDENT IN AN ORGANIZED HEALTH CARE EDUCATION/TRAINING PROGRAM | Admitting: STUDENT IN AN ORGANIZED HEALTH CARE EDUCATION/TRAINING PROGRAM

## 2025-03-13 VITALS
DIASTOLIC BLOOD PRESSURE: 73 MMHG | TEMPERATURE: 98.8 F | HEART RATE: 82 BPM | BODY MASS INDEX: 33.75 KG/M2 | HEIGHT: 66 IN | OXYGEN SATURATION: 97 % | RESPIRATION RATE: 18 BRPM | SYSTOLIC BLOOD PRESSURE: 110 MMHG | WEIGHT: 210 LBS

## 2025-03-13 DIAGNOSIS — Z78.9 ADMITTED TO LABOR AND DELIVERY: Primary | ICD-10-CM

## 2025-03-13 PROBLEM — Z36.89 ENCOUNTER FOR TRIAGE IN PREGNANT PATIENT: Status: ACTIVE | Noted: 2025-03-13

## 2025-03-13 LAB
ABO + RH BLD: NORMAL
BLD GP AB SCN SERPL QL: NEGATIVE
ERYTHROCYTE [DISTWIDTH] IN BLOOD BY AUTOMATED COUNT: 14.9 % (ref 10–15)
GLUCOSE BLDC GLUCOMTR-MCNC: 100 MG/DL (ref 70–99)
GLUCOSE BLDC GLUCOMTR-MCNC: 102 MG/DL (ref 70–99)
GLUCOSE BLDC GLUCOMTR-MCNC: 104 MG/DL (ref 70–99)
GLUCOSE SERPL-MCNC: 130 MG/DL (ref 70–99)
HCT VFR BLD AUTO: 36.6 % (ref 35–47)
HGB BLD-MCNC: 12.2 G/DL (ref 11.7–15.7)
HOLD SPECIMEN: NORMAL
MCH RBC QN AUTO: 27.7 PG (ref 26.5–33)
MCHC RBC AUTO-ENTMCNC: 33.3 G/DL (ref 31.5–36.5)
MCV RBC AUTO: 83 FL (ref 78–100)
PLATELET # BLD AUTO: 128 10E3/UL (ref 150–450)
RBC # BLD AUTO: 4.41 10E6/UL (ref 3.8–5.2)
SPECIMEN EXP DATE BLD: NORMAL
WBC # BLD AUTO: 8.5 10E3/UL (ref 4–11)

## 2025-03-13 PROCEDURE — G0463 HOSPITAL OUTPT CLINIC VISIT: HCPCS

## 2025-03-13 PROCEDURE — 86762 RUBELLA ANTIBODY: CPT | Performed by: STUDENT IN AN ORGANIZED HEALTH CARE EDUCATION/TRAINING PROGRAM

## 2025-03-13 PROCEDURE — 86780 TREPONEMA PALLIDUM: CPT | Performed by: STUDENT IN AN ORGANIZED HEALTH CARE EDUCATION/TRAINING PROGRAM

## 2025-03-13 PROCEDURE — 722N000001 HC LABOR CARE VAGINAL DELIVERY SINGLE

## 2025-03-13 PROCEDURE — 59409 OBSTETRICAL CARE: CPT | Performed by: STUDENT IN AN ORGANIZED HEALTH CARE EDUCATION/TRAINING PROGRAM

## 2025-03-13 PROCEDURE — 250N000013 HC RX MED GY IP 250 OP 250 PS 637: Performed by: STUDENT IN AN ORGANIZED HEALTH CARE EDUCATION/TRAINING PROGRAM

## 2025-03-13 PROCEDURE — 86900 BLOOD TYPING SEROLOGIC ABO: CPT | Performed by: STUDENT IN AN ORGANIZED HEALTH CARE EDUCATION/TRAINING PROGRAM

## 2025-03-13 PROCEDURE — 85048 AUTOMATED LEUKOCYTE COUNT: CPT | Performed by: STUDENT IN AN ORGANIZED HEALTH CARE EDUCATION/TRAINING PROGRAM

## 2025-03-13 PROCEDURE — 36415 COLL VENOUS BLD VENIPUNCTURE: CPT | Performed by: STUDENT IN AN ORGANIZED HEALTH CARE EDUCATION/TRAINING PROGRAM

## 2025-03-13 PROCEDURE — 85018 HEMOGLOBIN: CPT | Performed by: STUDENT IN AN ORGANIZED HEALTH CARE EDUCATION/TRAINING PROGRAM

## 2025-03-13 PROCEDURE — 82947 ASSAY GLUCOSE BLOOD QUANT: CPT | Performed by: STUDENT IN AN ORGANIZED HEALTH CARE EDUCATION/TRAINING PROGRAM

## 2025-03-13 PROCEDURE — 120N000001 HC R&B MED SURG/OB

## 2025-03-13 PROCEDURE — 10907ZC DRAINAGE OF AMNIOTIC FLUID, THERAPEUTIC FROM PRODUCTS OF CONCEPTION, VIA NATURAL OR ARTIFICIAL OPENING: ICD-10-PCS | Performed by: STUDENT IN AN ORGANIZED HEALTH CARE EDUCATION/TRAINING PROGRAM

## 2025-03-13 RX ORDER — CARBOPROST TROMETHAMINE 250 UG/ML
250 INJECTION, SOLUTION INTRAMUSCULAR
Status: DISCONTINUED | OUTPATIENT
Start: 2025-03-13 | End: 2025-03-14 | Stop reason: HOSPADM

## 2025-03-13 RX ORDER — METHYLERGONOVINE MALEATE 0.2 MG/ML
200 INJECTION INTRAVENOUS
Status: DISCONTINUED | OUTPATIENT
Start: 2025-03-13 | End: 2025-03-14 | Stop reason: HOSPADM

## 2025-03-13 RX ORDER — ONDANSETRON 4 MG/1
4 TABLET, ORALLY DISINTEGRATING ORAL EVERY 6 HOURS PRN
Status: DISCONTINUED | OUTPATIENT
Start: 2025-03-13 | End: 2025-03-13 | Stop reason: HOSPADM

## 2025-03-13 RX ORDER — METOCLOPRAMIDE HYDROCHLORIDE 5 MG/ML
10 INJECTION INTRAMUSCULAR; INTRAVENOUS EVERY 6 HOURS PRN
Status: DISCONTINUED | OUTPATIENT
Start: 2025-03-13 | End: 2025-03-13 | Stop reason: HOSPADM

## 2025-03-13 RX ORDER — OXYTOCIN/0.9 % SODIUM CHLORIDE 30/500 ML
340 PLASTIC BAG, INJECTION (ML) INTRAVENOUS CONTINUOUS PRN
Status: DISCONTINUED | OUTPATIENT
Start: 2025-03-13 | End: 2025-03-14 | Stop reason: HOSPADM

## 2025-03-13 RX ORDER — AMOXICILLIN 250 MG
2 CAPSULE ORAL
Status: DISCONTINUED | OUTPATIENT
Start: 2025-03-13 | End: 2025-03-14 | Stop reason: HOSPADM

## 2025-03-13 RX ORDER — MISOPROSTOL 200 UG/1
400 TABLET ORAL
Status: DISCONTINUED | OUTPATIENT
Start: 2025-03-13 | End: 2025-03-14 | Stop reason: HOSPADM

## 2025-03-13 RX ORDER — MISOPROSTOL 200 UG/1
400 TABLET ORAL
Status: DISCONTINUED | OUTPATIENT
Start: 2025-03-13 | End: 2025-03-13 | Stop reason: HOSPADM

## 2025-03-13 RX ORDER — POLYETHYLENE GLYCOL 3350 17 G/17G
17 POWDER, FOR SOLUTION ORAL DAILY PRN
Status: DISCONTINUED | OUTPATIENT
Start: 2025-03-13 | End: 2025-03-14 | Stop reason: HOSPADM

## 2025-03-13 RX ORDER — ACETAMINOPHEN 325 MG/1
650 TABLET ORAL EVERY 4 HOURS PRN
Status: DISCONTINUED | OUTPATIENT
Start: 2025-03-13 | End: 2025-03-14 | Stop reason: HOSPADM

## 2025-03-13 RX ORDER — CITRIC ACID/SODIUM CITRATE 334-500MG
30 SOLUTION, ORAL ORAL
Status: DISCONTINUED | OUTPATIENT
Start: 2025-03-13 | End: 2025-03-13 | Stop reason: HOSPADM

## 2025-03-13 RX ORDER — METOCLOPRAMIDE 10 MG/1
10 TABLET ORAL EVERY 6 HOURS PRN
Status: DISCONTINUED | OUTPATIENT
Start: 2025-03-13 | End: 2025-03-13 | Stop reason: HOSPADM

## 2025-03-13 RX ORDER — IBUPROFEN 800 MG/1
800 TABLET, FILM COATED ORAL
Status: DISCONTINUED | OUTPATIENT
Start: 2025-03-13 | End: 2025-03-13 | Stop reason: HOSPADM

## 2025-03-13 RX ORDER — OXYTOCIN 10 [USP'U]/ML
10 INJECTION, SOLUTION INTRAMUSCULAR; INTRAVENOUS
Status: DISCONTINUED | OUTPATIENT
Start: 2025-03-13 | End: 2025-03-14 | Stop reason: HOSPADM

## 2025-03-13 RX ORDER — LIDOCAINE 40 MG/G
CREAM TOPICAL
Status: DISCONTINUED | OUTPATIENT
Start: 2025-03-13 | End: 2025-03-13 | Stop reason: HOSPADM

## 2025-03-13 RX ORDER — IBUPROFEN 800 MG/1
800 TABLET, FILM COATED ORAL EVERY 6 HOURS PRN
Status: DISCONTINUED | OUTPATIENT
Start: 2025-03-13 | End: 2025-03-14 | Stop reason: HOSPADM

## 2025-03-13 RX ORDER — DEXTROSE MONOHYDRATE 25 G/50ML
25-50 INJECTION, SOLUTION INTRAVENOUS
Status: DISCONTINUED | OUTPATIENT
Start: 2025-03-13 | End: 2025-03-14 | Stop reason: HOSPADM

## 2025-03-13 RX ORDER — LOPERAMIDE HYDROCHLORIDE 2 MG/1
4 CAPSULE ORAL
Status: DISCONTINUED | OUTPATIENT
Start: 2025-03-13 | End: 2025-03-13 | Stop reason: HOSPADM

## 2025-03-13 RX ORDER — PROCHLORPERAZINE MALEATE 10 MG
10 TABLET ORAL EVERY 6 HOURS PRN
Status: DISCONTINUED | OUTPATIENT
Start: 2025-03-13 | End: 2025-03-13 | Stop reason: HOSPADM

## 2025-03-13 RX ORDER — HYDROCORTISONE 25 MG/G
CREAM TOPICAL 3 TIMES DAILY PRN
Status: DISCONTINUED | OUTPATIENT
Start: 2025-03-13 | End: 2025-03-14 | Stop reason: HOSPADM

## 2025-03-13 RX ORDER — KETOROLAC TROMETHAMINE 15 MG/ML
15 INJECTION, SOLUTION INTRAMUSCULAR; INTRAVENOUS
Status: DISCONTINUED | OUTPATIENT
Start: 2025-03-13 | End: 2025-03-13 | Stop reason: HOSPADM

## 2025-03-13 RX ORDER — ONDANSETRON 2 MG/ML
4 INJECTION INTRAMUSCULAR; INTRAVENOUS EVERY 6 HOURS PRN
Status: DISCONTINUED | OUTPATIENT
Start: 2025-03-13 | End: 2025-03-13 | Stop reason: HOSPADM

## 2025-03-13 RX ORDER — BISACODYL 10 MG
10 SUPPOSITORY, RECTAL RECTAL DAILY PRN
Status: DISCONTINUED | OUTPATIENT
Start: 2025-03-13 | End: 2025-03-14 | Stop reason: HOSPADM

## 2025-03-13 RX ORDER — NICOTINE POLACRILEX 4 MG
15-30 LOZENGE BUCCAL
Status: DISCONTINUED | OUTPATIENT
Start: 2025-03-13 | End: 2025-03-14 | Stop reason: HOSPADM

## 2025-03-13 RX ORDER — TRANEXAMIC ACID 10 MG/ML
1 INJECTION, SOLUTION INTRAVENOUS EVERY 30 MIN PRN
Status: DISCONTINUED | OUTPATIENT
Start: 2025-03-13 | End: 2025-03-13 | Stop reason: HOSPADM

## 2025-03-13 RX ORDER — DEXTROSE MONOHYDRATE 100 MG/ML
INJECTION, SOLUTION INTRAVENOUS CONTINUOUS PRN
Status: DISCONTINUED | OUTPATIENT
Start: 2025-03-13 | End: 2025-03-14 | Stop reason: HOSPADM

## 2025-03-13 RX ORDER — OXYTOCIN/0.9 % SODIUM CHLORIDE 30/500 ML
340 PLASTIC BAG, INJECTION (ML) INTRAVENOUS CONTINUOUS PRN
Status: DISCONTINUED | OUTPATIENT
Start: 2025-03-13 | End: 2025-03-13 | Stop reason: HOSPADM

## 2025-03-13 RX ORDER — LOPERAMIDE HYDROCHLORIDE 2 MG/1
4 CAPSULE ORAL
Status: DISCONTINUED | OUTPATIENT
Start: 2025-03-13 | End: 2025-03-14 | Stop reason: HOSPADM

## 2025-03-13 RX ORDER — SODIUM CHLORIDE 9 MG/ML
INJECTION, SOLUTION INTRAVENOUS CONTINUOUS PRN
Status: DISCONTINUED | OUTPATIENT
Start: 2025-03-13 | End: 2025-03-14 | Stop reason: HOSPADM

## 2025-03-13 RX ORDER — LOPERAMIDE HYDROCHLORIDE 2 MG/1
2 CAPSULE ORAL
Status: DISCONTINUED | OUTPATIENT
Start: 2025-03-13 | End: 2025-03-14 | Stop reason: HOSPADM

## 2025-03-13 RX ORDER — OXYTOCIN 10 [USP'U]/ML
10 INJECTION, SOLUTION INTRAMUSCULAR; INTRAVENOUS
Status: DISCONTINUED | OUTPATIENT
Start: 2025-03-13 | End: 2025-03-13 | Stop reason: HOSPADM

## 2025-03-13 RX ORDER — CARBOPROST TROMETHAMINE 250 UG/ML
250 INJECTION, SOLUTION INTRAMUSCULAR
Status: DISCONTINUED | OUTPATIENT
Start: 2025-03-13 | End: 2025-03-13 | Stop reason: HOSPADM

## 2025-03-13 RX ORDER — LOPERAMIDE HYDROCHLORIDE 2 MG/1
2 CAPSULE ORAL
Status: DISCONTINUED | OUTPATIENT
Start: 2025-03-13 | End: 2025-03-13 | Stop reason: HOSPADM

## 2025-03-13 RX ORDER — METHYLERGONOVINE MALEATE 0.2 MG/ML
200 INJECTION INTRAVENOUS
Status: DISCONTINUED | OUTPATIENT
Start: 2025-03-13 | End: 2025-03-13 | Stop reason: HOSPADM

## 2025-03-13 RX ORDER — OXYTOCIN/0.9 % SODIUM CHLORIDE 30/500 ML
100-340 PLASTIC BAG, INJECTION (ML) INTRAVENOUS CONTINUOUS PRN
Status: DISCONTINUED | OUTPATIENT
Start: 2025-03-13 | End: 2025-03-14 | Stop reason: HOSPADM

## 2025-03-13 RX ORDER — TRANEXAMIC ACID 10 MG/ML
1 INJECTION, SOLUTION INTRAVENOUS EVERY 30 MIN PRN
Status: DISCONTINUED | OUTPATIENT
Start: 2025-03-13 | End: 2025-03-14 | Stop reason: HOSPADM

## 2025-03-13 RX ADMIN — BENZOCAINE AND LEVOMENTHOL: 200; 5 SPRAY TOPICAL at 17:54

## 2025-03-13 ASSESSMENT — ACTIVITIES OF DAILY LIVING (ADL)
ADLS_ACUITY_SCORE: 15
ADLS_ACUITY_SCORE: 15
HEARING_DIFFICULTY_OR_DEAF: NO
ADLS_ACUITY_SCORE: 41
ADLS_ACUITY_SCORE: 15
ADLS_ACUITY_SCORE: 16
ADLS_ACUITY_SCORE: 15
DIFFICULTY_COMMUNICATING: NO
ADLS_ACUITY_SCORE: 15

## 2025-03-13 NOTE — PLAN OF CARE
Vaginal Delivery Note   of viable Female.  Nursery RN Elaine present.  Infant with spontaneous cry, to mother's abdomen, dried and stimulated. Placenta out after 20 minutes. Pt. Refuses oxytocin after delivery. Fundal checks as ordered. Jennifer cares provided. Mother and baby in stable condition. Baby skin-to-skin with mom. ID bands placed on infant, mom and dad.      Goal Outcome Evaluation:      Plan of Care Reviewed With: patient    Overall Patient Progress: improvingOverall Patient Progress: improving    Outcome Evaluation: progressing towards goals

## 2025-03-13 NOTE — PLAN OF CARE
Pt. Coping well in labor. Mother rubbed her lower back. She sways and rocks with contractions and alternates on the birthing ball. Reports contractions are more intense but tolerating them well. Support people supportive in the room. Will continue to assess.    Goal Outcome Evaluation:      Plan of Care Reviewed With: patient    Overall Patient Progress: improvingOverall Patient Progress: improving    Outcome Evaluation: Progressing towards goals

## 2025-03-13 NOTE — H&P
"[facility] Labor and Delivery History and Physical    Miladis Harper MRN# [id number]   Age: 29 year old YOB: 1995     Date of Admission:  3/13/2025    Primary care provider: No Ref-Primary, Physician           Chief Complaint:   Miladis Harper is a 29 year old female who is 39w2d pregnant and being admitted for active labor management.  She has a history of GDM A2 managed on insulin.            Pregnancy history:     OBSTETRIC HISTORY:    OB History    Para Term  AB Living   6 2 2 0 2 1   SAB IAB Ectopic Multiple Live Births   2 0 0 0 1      # Outcome Date GA Lbr Han/2nd Weight Sex Type Anes PTL Lv   6 Current            5 Term 23 38w6d / 00:22 4.478 kg (9 lb 14 oz) F Vag-Spont None N JADYN      Name: BRYANTGIRL A ALTON      Apgar1: 8  Apgar5: 9   4 SAB 22     AB, MISSED      3 Term 21 37w0d 03:39 / 02:13 2.722 kg (6 lb) M Vag-Spont Local N FD      Birth Comments: fetal demise, severe poly 5 amnioreductions club feet 2nd degree perineal laceration repaired      Name: WENDY HARPER FD      Apgar1: 0  Apgar5: 0   2 SAB 19 13w0d          1                 EDC: Estimated Date of Delivery: Mar 18, 2025    Prenatal Labs:   Lab Results   Component Value Date    AS Negative 2025    HEPBANG Nonreactive 2022    HGB 12.2 2025       GBS Status:   No results found for: \"GBS\"    Active Problem List  Patient Active Problem List   Diagnosis    Polyarthralgia    Benign gestational thrombocytopenia in third trimester    Bipolar 2 disorder (H)    Club foot of fetus affecting antepartum care of mother    Elevated fasting blood sugar    Gestational hypertension, third trimester    History of gestational diabetes    Insulin controlled gestational diabetes mellitus (GDM) in third trimester    Ketonuria    Persistent depressive disorder    Polyhydramnios in third trimester    Prediabetes    Supervision of high-risk pregnancy    " Suspected fetal disorder     (spontaneous vaginal delivery)    IUFD at 20 weeks or more of gestation    Encounter for triage in pregnant patient    Admitted to labor and delivery       Medication Prior to Admission  Medications Prior to Admission   Medication Sig Dispense Refill Last Dose/Taking    blood glucose (NO BRAND SPECIFIED) test strip Use to test blood sugar 4 times daily 120 strip 1 Past Month    blood glucose (NO BRAND SPECIFIED) test strip Use to test blood sugar 3 times daily or as directed 100 strip 1 Past Month    Continuous Glucose  (DEXCOM G7 ) ALBA Use to read blood sugars as per 's instructions. 1 each 0 3/13/2025 Morning    Continuous Glucose Sensor (DEXCOM G7 SENSOR) MISC 1 each every 10 days. Change sensor every 10 days 9 each 5 3/13/2025 Morning    Continuous Glucose Sensor (DEXCOM G7 SENSOR) MISC Change every 10 days. 3 each 5 3/13/2025 Morning    insulin NPH (NOVOLIN N FLEXPEN) 100 UNIT/ML injection Inject 40 Units subcutaneously at bedtime. Prime needle with 2 units prior to injecting dose. Diabetes Center adjusting dose. 15 mL 1 Past Week    insulin pen needle (32G X 4 MM) 32G X 4 MM miscellaneous Use 1 pen needle daily 100 each 1 Past Week    Prenatal Vit-Fe Fumarate-FA (PRENATAL MULTIVITAMIN  PLUS IRON) 27-1 MG TABS Take 1 tablet by mouth daily   3/12/2025   .        Maternal Past Medical History:     Past Medical History:   Diagnosis Date    Depressive disorder     Gestational diabetes                        Family History:     Family History   Problem Relation Age of Onset    Diabetes Type 2  Father     Diabetes Father     Hypertension Father     Ataxia Sister     Cancer Maternal Grandmother     Breast Cancer Maternal Grandmother     Genetic Disorder Nephew                Social History:     Social History     Tobacco Use    Smoking status: Never     Passive exposure: Never    Smokeless tobacco: Never   Substance Use Topics    Alcohol use: Never            " Review of Systems:   Review of systems negative except as stated above.         Physical Exam:   Vitals were reviewed  Blood pressure 132/76, temperature 98.2  F (36.8  C), temperature source Oral, resp. rate 18, height 1.676 m (5' 6\"), weight 95.3 kg (210 lb), last menstrual period 06/11/2024, SpO2 98%, unknown if currently breastfeeding.  Constitutional:   awake, alert, cooperative, no apparent distress, and appears stated age   Eyes:   Lids and lashes normal, pupils equal, round and reactive to light, extra ocular muscles intact, sclera clear, conjunctiva normal   ENT:   Normocephalic, without obvious abnormality, atramatic, external ears without lesions, oral pharynx with moist mucus membranes, tonsils without erythema or exudates, gums normal and good dentition.   Neck:   Supple, symmetrical, trachea midline, no adenopathy, thyroid symmetric, not enlarged and no tenderness, skin normal   Hematologic / Lymphatic:   no cervical lymphadenopathy   Lungs:   No increased work of breathing, good air exchange, clear to auscultation bilaterally, no crackles or wheezing   Cardiovascular:   Normal apical impulse, regular rate and rhythm, normal S1 and S2, no S3 or S4, and no murmur noted   Abdomen:   No scars, normal bowel sounds, soft, non-distended, non-tender, no masses palpated, no hepatosplenomegally   Genitounirinary:   External Genitalia:  General appearance; normal  Vagina:  General appearance normal  Cervix:  See below  Uterus:  gravid   Musculoskeletal:   There is no redness, warmth, or swelling of the joints.  Full range of motion noted.  Motor strength is 5 out of 5 all extremities bilaterally.  Tone is normal.   Neurologic:   Awake, alert, oriented to name, place and time.  Cranial nerves II-XII are grossly intact.  Motor is 5 out of 5 bilaterally.  Sensory is intact.  gait is normal.   Neuropsychiatric:   General: normal, calm and normal eye contact  Level of consciousness: alert / normal  Orientation: " oriented to self, place, time and situation   Skin:   no bruising or bleeding      Cervix:   Membranes: intact   Dilation: 4   Effacement: 60%   Station:-2   Consistency: soft   Position: Posterior  Presentation:Cephalic  Fetal Heart Rate Tracing: variables and early decelerations  Tocometer: external monitor                       Assessment:   Miladis Harper is a 39w2d pregnant female admitted with active labor management.          Plan:   Kelli Harper is a 29 year old  admitted for labor.  She presented to L&D overnight with contractions, membranes are intact.  She has history of GDM A2 on insulin.  She has had good glycemic control during her pregnancy with very good HA1c of 5.4.    - Admit to L&D, see inpatient orders for more details  EFW for age is 97% so we anticipate a large baby (by last BPP with growth on )  - Patient desires low intervention labor and delivery- birth plan reviewed.  If necessary, will intervene accordingly  GDM A2 on insulin- was taking up to 40 units of insulin during this pregnancy to get BG under control.  Dexcom data consistent with good control and HA1c excellent  Blood glucose on admission 130, down to 102 on recheck at 7:43a.    OB GDM protocol discussed with patient- reviewed risks of intrapartum risks of uncontrolled hyperglycemia and subsequent risks of hypoglycemia in .  Patient understands the risks and benefits and declines insulin infusion at this time  Agrees to continued POC BG checks.  At this time, she is essentially near euglycemia and certainly within goal parameters of the OB diabetes protocol.   Goal is to maintain BG levels between   - Do not anticipate she will be in active labor very long and since insulin demand decreases significantly after delivery, we will need to be cautious to avoid maternal hypoglycemia.  We anticipate infant hypoglycemia and will act accordingly  - Continue external fetal monitoring with Pari novii.    - Cord  blood needed as patient is O+  - Anticipate       Tamiko Sanon MD  3/13/2025  5:26 AM

## 2025-03-13 NOTE — PLAN OF CARE
Pt. More uncomfortable but tolerating labor well. Provider aware of variables and reviewed EFM tracing. Large amount of amniotic fluid noted. Breathing well through contractions. Changing positions frequently. Support people massaging low back for her. Provider on unit. Support and assistance provided.    Goal Outcome Evaluation:      Plan of Care Reviewed With: patient    Overall Patient Progress: improvingOverall Patient Progress: improving    Outcome Evaluation: progressing towards goals

## 2025-03-13 NOTE — L&D DELIVERY NOTE
Delivery Summary    Miladis Harper MRN# [id number]   Age: 29 year old YOB: 1995     Labor Event Times:    Labor Onset Date 3/13/2025      Labor Onset Time 11:20 AM   Dilation Complete Date 3/13/2025   Dilation Complete Time 3:55 PM      Start Pushing Date        Start Pushing Time            Labor Length:    1st Stage (hrs/min)       2nd Stage (hrs/min)       3rd Stage (hrs/min)           Labor Events:     Labor No   Rupture Date 3/13/2025   Rupture Time 2:25 PM   Rupture Type Artificial Rupture of Membranes   Fluid Color Clear   Labor Type Spontaneous   Induction     Induction Indication         Augmentation     Labor Complications     Additional Complications     Management of Labor        Antibiotics     IV Antibiotic Given     Additional Management     Fetal Status Prior to  Delivery     Fetal Status Comments         Cervical Ripening:    Date      Time      Type          Delivery:    Episiotomy     Local Anesthetic        Lacerations     Sponge Count Correct       Needle Count Correct     Final Count by: YODIT NAYAK;LUCILA AGUILAR     Blood loss (ml)     Packing Intentionally Left In     Number     Comments           Information for the patient's :  Joanne Harper-Miladis [[id number]]     Delivery  3/13/2025 4:01 PM by     Sex:  female Gestational Age: 39w2d  Delivery Clinician:     Living?:            APGARS  One minute Five minutes Ten minutes   Skin color:            Heart rate:            Grimace:            Muscle tone:            Breathing:            Totals: 8  8         Presentation/position:           Resuscitation and Interventions: Method:  None  Oxygen Type:     Intubation Time:   # of Attempts:     ETT Size:        Tracheal Suction:     Tracheal returns:       Care at Delivery:   of viable baby girl. Babe bulb suctioned by MD then placed skin to skin with mom. Dried and stimulated. Strong cry at one minute. Purple at 5 minutes but pinking up.  HR always over 100. Ducky bands placed on mom, dad, and baby.        Cord information:     Disposition of cord blood:      Blood gases sent?    Complications:     Placenta: Delivered:           appearance.  Comments:  .  Disposition:     Measurements:  Weight:    Height:    Head circumference:    Chest circumference:     Temperature:     Other providers:       Additional  information:  Forceps:    Verbal Informed Consent Obtained:       Alternative Labor Strategies Discussed:     Emergency Resources Available:       Type:       Accrued Pulling Time:       # of Pulls:      Position:     Fetal Station:       Indications:      Other Indications:     Operative Vaginal Delivery Brief Note Forceps:        Vacuum:    Verbal Informed Consent Obtained:     Alternative Labor Strategies Discussed:     Emergency Resources Available:     Type:      Accrued Pulling Time:       # of Pop-Offs:       # of Pulls:       Position:     Fetal Station:      Indications for Vacuum:       Other Indications:    Operative Vaginal Delivery Brief Note Vacuum:        Shoulder Dystocia Shoulder Dystocia    No data filed          Breech:       : Type:     Indications for Primary:     Indications for Secondary:     Other Indications:        Observed anomalies     Output in Delivery Room:                      Tamiko Sanon MD

## 2025-03-13 NOTE — PLAN OF CARE
"Pt. States \"the tub feels good\". Coping well with labor. Pari in place with continuous monitoring. Call light within reach. Will continue to assess.    Goal Outcome Evaluation:      Plan of Care Reviewed With: patient    Overall Patient Progress: improvingOverall Patient Progress: improving    Outcome Evaluation: Progressing towards goals      "

## 2025-03-13 NOTE — PROGRESS NOTES
"(S) Kelli was admitted to L&D overnight with contractions.  She is progressing well and appears to tolerate labor very well.  She is pleasantly conversational at this time  (O)  Vitals:    25 0254 25 0744 25 1120   BP: 132/76 135/85 129/76   BP Location: Left arm     Patient Position: Semi-Hopkins's     Cuff Size: Adult Regular Adult Regular Adult Regular   Resp: 18 18 18   Temp: 98.2  F (36.8  C) 97.6  F (36.4  C) 97.8  F (36.6  C)   TempSrc: Oral Oral Oral   SpO2: 98% 99% 99%   Weight: 95.3 kg (210 lb)     Height: 1.676 m (5' 6\")       Cervix:   Membranes: intact   Dilation: 9   Effacement: 90%   Station:-2    Fetal Heart Rate Tracing: reactive and reassuring  Cat:1          Tocometer: external monitor    (A/P)  See H&P for details  Continue current plan.  She is tolerating labor well with no analgesia of any sort.  For Hx of GDMA2, she did agree to rechecking her POC glucose again and this was at 100 which is great and at goal per OB diabetes protocol.    Per protocol, goal glucose level to remain between   Her bag of water remains intact and she declines any interventions at this time   R/B/A of amniotomy discussed, she declines at this time citing desire to progress naturally.    Continue to monitor closely with continuous external fetal monitor  Anticipate  this afternoon    Tamiko Sanon         "

## 2025-03-13 NOTE — PROGRESS NOTES
Face to face report given with opportunity to observe patient.    Report given to Lara Ojeda RN   3/13/2025  7:19 AM

## 2025-03-13 NOTE — PLAN OF CARE
Pt. Able to get some rest. Pt. Wanting a natural and low intervention birth. Provider in room discussing plan of care with Diabetes management protocol and educated regarding risks of not following protocol. Agreeable to some blood sugar checks at this time. Pt and  requested to look at the protocol documents. Copied documents and given to patient. Up in the room independently. Encouraged position change and frequent activity. Pari in place. Family and  supportive at bedside.       Goal Outcome Evaluation:      Plan of Care Reviewed With: patient    Overall Patient Progress: improvingOverall Patient Progress: improving    Outcome Evaluation: Progressing towards goals

## 2025-03-13 NOTE — PLAN OF CARE
Labor Admission  Miladis Harper  MRN: [id number]  Gestational Age: 39w2d      Miladis Harper is admitted for labor ahead of scheduled induction.  States gabi since 2300 on 3/12/25 with contractions increasing in intensity and frequency at approximately 0100 this AM.  Rates pain at 2/10.  Denies bleeding and denies loss of fluid.     Dr Sanon notified of arrival and condition and Intrapartum orders entered by provider.    See flowsheet for FHR and Uterine contraction assessment.     Patient is alert and oriented X 3, Patient oriented to room, unit, hourly rounding, and plan of care.  Patient has spouse, mother and  present in room. Birth plan provided by patient and labor preferences reviewed with patient as well as desires for infant cares immediately post partum. Call light within reach. Explained admission packet with patient bill of rights brochure.     Inpatient nursing criteria listed below was met:  Health care directives status obtained and documented: Yes  Patient identifies a surrogate decision maker: No, spouse is present at bedside  Core Measure diagnosis present:: No  Vaccine assessment done and vaccines ordered if appropriate. Done  Clergy visit ordered if patient requests: N/A  Skin issues/needs documented: N/A  Fall Prevention (Med and High risk): Not a fall risk  Care Plan initiated: Yes  Education Documented (Reminder to educate patient if MRSA is present on admission): Yes  Education Assessment documented:Yes  Patient has discharge needs (If yes, please explain):  No      Goal Outcome Evaluation:      Plan of Care Reviewed With: patient, spouse    Overall Patient Progress: improvingOverall Patient Progress: improving    Outcome Evaluation: Progressing towards goals

## 2025-03-13 NOTE — PLAN OF CARE
Pt. Up in the room walking, doing the birthing ball, and changing positions.  Notified of variable decels and she reviewed strip.  SVE 8cm per provider.  Pt. Agreeable to blood sugar check at this time but refusing hourly blood sugar checks and to start the diabetes protocol in active labor. Dr. Sanon on the unit and aware of patient's decision. Support and assistance provided. Tolerating contractions and labor well with , mother and  supporting her.      Goal Outcome Evaluation:      Plan of Care Reviewed With: patient    Overall Patient Progress: improvingOverall Patient Progress: improving    Outcome Evaluation: Progressing towards goals

## 2025-03-13 NOTE — PROGRESS NOTES
"(S) Kelli is coping well with active labor.  Ambulatory and working with  on position changes, birthing ball.  Her contractions are intensifying but she is tolerating them well.    (O)  Vitals:    25 0254 25 0744 25 1120   BP: 132/76 135/85 129/76   BP Location: Left arm     Patient Position: Semi-Hopkins's     Cuff Size: Adult Regular Adult Regular Adult Regular   Resp: 18 18 18   Temp: 98.2  F (36.8  C) 97.6  F (36.4  C) 97.8  F (36.6  C)   TempSrc: Oral Oral Oral   SpO2: 98% 99% 99%   Weight: 95.3 kg (210 lb)     Height: 1.676 m (5' 6\")       Cervix:   Membranes: AROM   Dilation: 9   Effacement: 100%   Station:-2    Fetal Heart Rate Tracing: reactive and reassuring  Cat:1          Tocometer: external monitor    (A/P)  AROM done as she had not demonstrated significant cervical change from last cervical check before noon.  Baby has been tolerating labor fine with reassuring tracings.  I did discuss AROM with patient and patient agreeable  Continue close monitoring of glucose 2/2 Hx of GDMA2  Anticipate     Tamiko Sanon     "

## 2025-03-13 NOTE — PLAN OF CARE
Pt. Doing well with contractions. Laying down trying to rest. Offered support. Declines interventions at this time. Pari in place. Will continue to assess.      Goal Outcome Evaluation:      Plan of Care Reviewed With: patient    Overall Patient Progress: improvingOverall Patient Progress: improving    Outcome Evaluation: Progressing towards goals

## 2025-03-14 VITALS
TEMPERATURE: 98.3 F | BODY MASS INDEX: 31.12 KG/M2 | OXYGEN SATURATION: 97 % | HEART RATE: 75 BPM | DIASTOLIC BLOOD PRESSURE: 68 MMHG | WEIGHT: 193.6 LBS | SYSTOLIC BLOOD PRESSURE: 110 MMHG | HEIGHT: 66 IN | RESPIRATION RATE: 18 BRPM

## 2025-03-14 LAB
HGB BLD-MCNC: 12 G/DL (ref 11.7–15.7)
HOLD SPECIMEN: NORMAL
RUBV IGG SERPL QL IA: 0.25 INDEX
RUBV IGG SERPL QL IA: NORMAL
T PALLIDUM AB SER QL: NONREACTIVE

## 2025-03-14 PROCEDURE — 85018 HEMOGLOBIN: CPT | Performed by: STUDENT IN AN ORGANIZED HEALTH CARE EDUCATION/TRAINING PROGRAM

## 2025-03-14 PROCEDURE — 36415 COLL VENOUS BLD VENIPUNCTURE: CPT | Performed by: STUDENT IN AN ORGANIZED HEALTH CARE EDUCATION/TRAINING PROGRAM

## 2025-03-14 ASSESSMENT — ACTIVITIES OF DAILY LIVING (ADL)
ADLS_ACUITY_SCORE: 16

## 2025-03-14 NOTE — PLAN OF CARE
Data: Vital signs within normal limits. Postpartum checks within normal limits - see flow record. Patient eating and drinking normally. Patient able to empty bladder independently and is up ambulating. No apparent signs of infection. Perineum swollen due to tears, has ice, tucks pads, and jimenez bottle, encouraging a bath. Patient performing self cares and is able to care for infant.  Action: Patient denies pain/want or need for pain medication. Patient education done - see flow record.  Response: Positive attachment behaviors observed with infant. Support persons present.   Plan: Pt hopes to leave this evening after baby's 24 hour testing. Spoke with her that it pends all testing coming back well, the day going ok, and an OK from Dr. Sanon. Pt agreeable to plan. Encouraged to call with any questions or concerns.     Goal Outcome Evaluation:      Plan of Care Reviewed With: patient, spouse    Overall Patient Progress: improvingOverall Patient Progress: improving    Outcome Evaluation: Pt progressing in all areas.

## 2025-03-14 NOTE — PLAN OF CARE
Face to face report given with opportunity to observe patient.    Report given to Regina Smith RN   3/14/2025  7:20 AM

## 2025-03-14 NOTE — DISCHARGE INSTRUCTIONS
Please call [facility] to schedule your postpartum appointments (2 week and 6 week). Thank you!    Warning Signs after Having a Baby    Keep this paper on your fridge or somewhere else where you can see it.    Call your provider if you have any of these symptoms up to 12 weeks after having your baby.    Thoughts of hurting yourself or your baby  Pain in your chest or trouble breathing  Severe headache not helped by pain medicine  Eyesight concerns (blurry vision, seeing spots or flashes of light, other changes to eyesight)  Fainting, shaking or other signs of a seizure    Call 9-1-1 if you feel that it is an emergency.     The symptoms below can happen to anyone after giving birth. They can be very serious. Call your provider if you have any of these warning signs.    My provider s phone number: _______________________    Losing too much blood (hemorrhage)    Call your provider if you soak through a pad in less than an hour or pass blood clots bigger than a golf ball. These may be signs that you are bleeding too much.    Blood clots in the legs or lungs    After you give birth, your body naturally clots its blood to help prevent blood loss. Sometimes this increased clotting can happen in other areas of the body, like the legs or lungs. This can block your blood flow and be very dangerous.     Call your provider if you:  Have a red, swollen spot on the back of your leg that is warm or painful when you touch it.   Are coughing up blood.     Infection    Call your provider if you have any of these symptoms:  Fever of 100.4 F (38 C) or higher.  Pain or redness around your stitches if you had an incision.   Any yellow, white, or green fluid coming from places where you had stitches or surgery.    Mood Problems (postpartum depression)    Many people feel sad or have mood changes after having a baby. But for some people, these mood swings are worse.     Call your provider right away if you feel so anxious or  nervous that you can't care for yourself or your baby.    Preeclampsia (high blood pressure)    Even if you didn't have high blood pressure when you were pregnant, you are at risk for the high blood pressure disease called preeclampsia. This risk can last up to 12 weeks after giving birth.     Call your provider if you have:   Pain on your right side under your rib cage  Sudden swelling in the hands and face    Remember: You know your body. If something doesn't feel right, get medical help.     For informational purposes only. Not to replace the advice of your health care provider. Copyright 2020 [employer]. All rights reserved. Clinically reviewed by Nicki Aparicio, RNC-OB, MSN. [employer] [id number] - Rev 02/23.

## 2025-03-14 NOTE — PLAN OF CARE
Patient discharged at 1802 PM via ambulation accompanied by spouse, , and staff. Prescriptions - None ordered for discharge. All belongings sent with patient.     Discharge instructions reviewed with patient and spouse. Listed belongings gathered and returned to patient.     [place]     [facility] [place] made:  No, pt instructed to call and follow up in 2 and 6 weeks with [facility]. Unable to make follow up appointments right now due to them not being open today.  Home medications returned to patient: N/A  Patient reports pain was well managed at discharge: Yes    Pt sent with extra supplies - pads, ice packs, etc. Walked down to car and watched  safely placed in car.

## 2025-03-14 NOTE — PLAN OF CARE
Data: Vital signs within normal limits. Postpartum checks within normal limits - see flow record. Patient eating and drinking normally. Patient able to empty bladder independently and is up ambulating. No apparent signs of infection. Perineum swollen due to tears, has ice, tucks pads, and jimenez bottle, encouraging a bath. Patient performing self cares and is able to care for infant.  Action: Patient denies pain/want or need for pain medication. Patient education done - see flow record.  Response: Positive attachment behaviors observed with infant. Support persons present.   Plan: Pt hopes to leave this evening after baby's 24 hour testing. Spoke with her that it pends all testing coming back well, it as been approved by Dr. Sanon pending results.. Pt agreeable to plan. Encouraged to call with any questions or concerns. Continued planning for discharge.             Goal Outcome Evaluation:      Plan of Care Reviewed With: patient, spouse    Overall Patient Progress: improvingOverall Patient Progress: improving    Outcome Evaluation: Progressing per plan of care.

## 2025-03-14 NOTE — PLAN OF CARE
Report given with opportunity to observe patient.    Report given to Demond Daniels RN   3/13/2025  7:25 PM

## 2025-03-14 NOTE — PLAN OF CARE
Goal Outcome Evaluation:      Plan of Care Reviewed With: patient, spouse    Overall Patient Progress: improving  Overall Patient Progress: improving    Outcome Evaluation: Patient progressing in goal areas. Up ambulating, voiding well. Breastfeeding going well. Denies pain management at this time.    Data: Vital signs within normal limits. Postpartum checks within normal limits - see flow record. Patient eating and drinking normally. Patient able to empty bladder independently and is up ambulating. No apparent signs of infection.  Tears swollen and red, no signs of infection noted at this time. Patient performing self cares and is able to care for infant.  Action: Patient reports 7/10 pain in perineum and vagina, but declines pain management at this time. Educated on pain relief options. Patient currently using tucks, jimenez bottle and stated that she may take a sitz bath. Will notify RN when she would like a bath. Patient education done. See flow record.  Response: Positive attachment behaviors observed with infant. Support person is present.   Plan: Anticipate discharge per plan of care.

## 2025-03-15 ENCOUNTER — HEALTH MAINTENANCE LETTER (OUTPATIENT)
Age: 30
End: 2025-03-15

## 2025-03-17 NOTE — PROGRESS NOTES
Kelli presents for routine OB visit  She is doing well overall  She is currently at 38w3 gestation  Endorsing good fetal movements  Denies VB, LOF, contractions  She is GDMA2 on insulin  Has been doing very well with excellent glycemic control  Cephalic by leopold maneuvers  Induction scheduled for next thursday- Will be 39w2  NST will be done today  Education done- discussed signed of labor and when to come or call in    Tamiko Sanon MD

## 2025-03-25 ENCOUNTER — MYC REFILL (OUTPATIENT)
Dept: FAMILY MEDICINE | Facility: OTHER | Age: 30
End: 2025-03-25

## 2025-03-25 DIAGNOSIS — O24.419 GESTATIONAL DIABETES MELLITUS (GDM) IN THIRD TRIMESTER, GESTATIONAL DIABETES METHOD OF CONTROL UNSPECIFIED: ICD-10-CM

## 2025-03-25 DIAGNOSIS — R30.0 DYSURIA: ICD-10-CM

## 2025-03-25 RX ORDER — ACYCLOVIR 400 MG/1
1 TABLET ORAL
Qty: 9 EACH | Refills: 5 | Status: CANCELLED | OUTPATIENT
Start: 2025-03-25

## 2025-04-09 NOTE — DISCHARGE SUMMARY
"Discharge Summary    Miladis Harper MRN# [id number]   YOB: 1995 Age: 29 year old     Date of Admission:  3/13/2025  Date of Discharge:  3/14//2025  Admitting Physician:  Tamiko Sanon MD  Discharge Physician:  Tamiko Sanon  Discharging Service:  [facility] clinic: [facility]  Primary Provider: No Ref-Primary, Physician          Admission Diagnoses:   Admitted to labor and delivery [Z78.9]          Discharge Diagnosis:     Patient Active Problem List   Diagnosis    Polyarthralgia    Benign gestational thrombocytopenia in third trimester    Bipolar 2 disorder (H)    Club foot of fetus affecting antepartum care of mother    Elevated fasting blood sugar    Gestational hypertension, third trimester    History of gestational diabetes    Insulin controlled gestational diabetes mellitus (GDM) in third trimester    Ketonuria    Persistent depressive disorder    Polyhydramnios in third trimester    Prediabetes    Supervision of high-risk pregnancy    Suspected fetal disorder     (spontaneous vaginal delivery)    IUFD at 20 weeks or more of gestation    Encounter for triage in pregnant patient    Admitted to labor and delivery                Discharge Disposition:     Discharged to home           Condition on Discharge:     Discharge condition: Stable   Discharge vitals: Blood pressure 110/68, pulse 75, temperature 98.3  F (36.8  C), temperature source Oral, resp. rate 18, height 1.676 m (5' 6\"), weight 87.8 kg (193 lb 9.6 oz), last menstrual period 2024, SpO2 97%, unknown if currently breastfeeding.   Code status on discharge: Full Code           Procedures / Labs / Imaging:   No procedures performed during this admission          Medications Prior to Admission:     No medications prior to admission.             Discharge Medications:     No current facility-administered medications for this encounter.     Current Outpatient Medications   Medication Sig Dispense Refill " "   blood glucose (NO BRAND SPECIFIED) test strip Use to test blood sugar 3 times daily or as directed 100 strip 1    Continuous Glucose  (DEXCOM G7 ) ALBA Use to read blood sugars as per 's instructions. 1 each 0    Continuous Glucose Sensor (DEXCOM G7 SENSOR) MISC 1 each every 10 days. Change sensor every 10 days 9 each 5    Continuous Glucose Sensor (DEXCOM G7 SENSOR) MISC Change every 10 days. 3 each 5    insulin pen needle (32G X 4 MM) 32G X 4 MM miscellaneous Use 1 pen needle daily 100 each 1    Prenatal Vit-Fe Fumarate-FA (PRENATAL MULTIVITAMIN  PLUS IRON) 27-1 MG TABS Take 1 tablet by mouth daily      blood glucose (NO BRAND SPECIFIED) test strip Use to test blood sugar 4 times daily 200 strip 2             Consultations:   None            Brief History of Illness:   Miladis Harper is a 29 year old female who was admitted for labor and delivery          Hospital Course:       Encounter for triage in pregnant patient    Admitted to labor and delivery                Final Day of Progress before Discharge:       Assessment and Plan:  Active Problems:    Admitted to labor and delivery    Assessment: Delivered a healthy full term AGA baby girl    Uncomplicated post partum course.  No complications.    Plan: Discharge to home                Physical Exam:  Vitals were reviewed  Blood pressure 110/68, pulse 75, temperature 98.3  F (36.8  C), temperature source Oral, resp. rate 18, height 1.676 m (5' 6\"), weight 87.8 kg (193 lb 9.6 oz), last menstrual period 06/11/2024, SpO2 97%, unknown if currently breastfeeding.  Constitutional:   awake, alert, cooperative, no apparent distress, and appears stated age     Lungs:   No increased work of breathing, good air exchange, clear to auscultation bilaterally, no crackles or wheezing     Cardiovascular:   Normal apical impulse, regular rate and rhythm, normal S1 and S2, no S3 or S4, and no murmur noted     Neuropsychiatric:   General: normal, " calm, and normal eye contact  Affect: normal     Skin:   no bruising or bleeding     Additional findings:   Abdomen:  soft, non-tender, non-distended, BS+, fundus well below umbilicus          Data:  All laboratory data reviewed         Significant Results:     None             Pending Results:     None           Discharge Instructions and Follow-Up:     Discharge diet: Regular   Discharge activity: Activity as tolerated   Discharge follow-up: Follow up with primary care provider in 6 weeks   Outpatient therapy: None    Home Care agency: None    Supplies and equipment: None   Lines and drains: None    Wound care: None   Other instructions: Take Vitamin D 50,000 international unit(s) weekly with a meal that has fat in it for 3 months while breastfeeding     Tamiko Sanon MD

## 2025-04-10 DIAGNOSIS — O24.414 INSULIN CONTROLLED GESTATIONAL DIABETES MELLITUS (GDM) IN THIRD TRIMESTER: ICD-10-CM

## 2025-04-10 RX ORDER — ACYCLOVIR 400 MG/1
TABLET ORAL
Qty: 1 EACH | Refills: 0 | Status: SHIPPED | OUTPATIENT
Start: 2025-04-10

## 2025-04-10 RX ORDER — ACYCLOVIR 400 MG/1
TABLET ORAL
Qty: 3 EACH | Refills: 5 | Status: SHIPPED | OUTPATIENT
Start: 2025-04-10

## 2025-04-15 ENCOUNTER — TELEPHONE (OUTPATIENT)
Dept: CARE COORDINATION | Facility: OTHER | Age: 30
End: 2025-04-15

## 2025-04-15 NOTE — TELEPHONE ENCOUNTER
4/15/2025 4:11 PM  Writer called patient as it appears pt scheduled an appointment via my chart and wrote message in appt note. It appears pt was not scheduled for postpartum appointment.   Writer called pt and left VM requesting call back, provided call back number. Writer scheduled pt and holding time until patient calls back to verify appointment and to clarify the my chart appt patient had scheduled.     Per Dr. Sanon, pt was planning on following-up with midwife, but if patient is willing Dr. Sanon would like a 6 week PP follow-up.   Karyn Casas, RN

## 2025-04-21 ENCOUNTER — MYC MEDICAL ADVICE (OUTPATIENT)
Dept: FAMILY MEDICINE | Facility: OTHER | Age: 30
End: 2025-04-21

## 2025-04-21 ENCOUNTER — TELEPHONE (OUTPATIENT)
Dept: FAMILY MEDICINE | Facility: OTHER | Age: 30
End: 2025-04-21

## 2025-04-21 DIAGNOSIS — O24.419 GESTATIONAL DIABETES MELLITUS (GDM) IN THIRD TRIMESTER, GESTATIONAL DIABETES METHOD OF CONTROL UNSPECIFIED: Primary | ICD-10-CM

## 2025-04-21 RX ORDER — ACYCLOVIR 400 MG/1
TABLET ORAL
Qty: 1 EACH | Refills: 0 | Status: SHIPPED | OUTPATIENT
Start: 2025-04-21

## 2025-04-21 RX ORDER — ACYCLOVIR 400 MG/1
TABLET ORAL
Qty: 3 EACH | Refills: 5 | Status: SHIPPED | OUTPATIENT
Start: 2025-04-21

## 2025-04-21 NOTE — TELEPHONE ENCOUNTER
Ja Killian, I did just resend it to the [facility] pharmacy.  Please extend to her my sincere apologies for having to cancel our visit.

## 2025-04-21 NOTE — TELEPHONE ENCOUNTER
Patient was here for an appointment today 04/21. Appointment rescheduled for next Monday 04/28 due to PCP not being in clinic at time of visit. Patient is wondering if her dexcom can be sent to [facility] pharmacy? It was originally sent to [facility] pharmacy but patient would like sent to [facility] instead. Please advise.     Patient also has questions about diabetes, dexcom and what needs to be done post partum regarding diabetes that she would like to go over at her visit on Monday

## 2025-05-12 NOTE — TELEPHONE ENCOUNTER
5/12/2025 8:48 AM   PCP requested writer reach out to patient to inquire if she received her dexcom. See my chart.  Karyn Casas RN